# Patient Record
Sex: FEMALE | Race: WHITE | NOT HISPANIC OR LATINO | Employment: FULL TIME | URBAN - METROPOLITAN AREA
[De-identification: names, ages, dates, MRNs, and addresses within clinical notes are randomized per-mention and may not be internally consistent; named-entity substitution may affect disease eponyms.]

---

## 2016-04-04 LAB
HPV ABSTRACT: NORMAL
PAP SMEAR - HIM PATIENT REPORTED: NEGATIVE

## 2017-01-04 ENCOUNTER — ANESTHESIA EVENT (OUTPATIENT)
Dept: OBGYN | Facility: CLINIC | Age: 32
End: 2017-01-04
Payer: OTHER GOVERNMENT

## 2017-01-04 ENCOUNTER — TELEPHONE (OUTPATIENT)
Dept: NURSING | Facility: CLINIC | Age: 32
End: 2017-01-04

## 2017-01-04 ENCOUNTER — ANESTHESIA (OUTPATIENT)
Dept: OBGYN | Facility: CLINIC | Age: 32
End: 2017-01-04
Payer: OTHER GOVERNMENT

## 2017-01-04 ENCOUNTER — HOSPITAL ENCOUNTER (INPATIENT)
Facility: CLINIC | Age: 32
LOS: 2 days | Discharge: HOME | End: 2017-01-06
Attending: OBSTETRICS & GYNECOLOGY | Admitting: OBSTETRICS & GYNECOLOGY
Payer: OTHER GOVERNMENT

## 2017-01-04 LAB
ABO + RH BLD: NORMAL
ABO + RH BLD: NORMAL
SPECIMEN EXP DATE BLD: NORMAL

## 2017-01-04 PROCEDURE — 86900 BLOOD TYPING SEROLOGIC ABO: CPT | Performed by: OBSTETRICS & GYNECOLOGY

## 2017-01-04 PROCEDURE — 40000671 ZZH STATISTIC ANESTHESIA CASE

## 2017-01-04 PROCEDURE — 86780 TREPONEMA PALLIDUM: CPT | Performed by: OBSTETRICS & GYNECOLOGY

## 2017-01-04 PROCEDURE — 25800025 ZZH RX 258: Performed by: OBSTETRICS & GYNECOLOGY

## 2017-01-04 PROCEDURE — 00HU33Z INSERTION OF INFUSION DEVICE INTO SPINAL CANAL, PERCUTANEOUS APPROACH: ICD-10-PCS | Performed by: ANESTHESIOLOGY

## 2017-01-04 PROCEDURE — 12000031 ZZH R&B OB CRITICAL

## 2017-01-04 PROCEDURE — 3E0R3CZ INTRODUCTION OF REGIONAL ANESTHETIC INTO SPINAL CANAL, PERCUTANEOUS APPROACH: ICD-10-PCS | Performed by: ANESTHESIOLOGY

## 2017-01-04 PROCEDURE — 10907ZC DRAINAGE OF AMNIOTIC FLUID, THERAPEUTIC FROM PRODUCTS OF CONCEPTION, VIA NATURAL OR ARTIFICIAL OPENING: ICD-10-PCS | Performed by: OBSTETRICS & GYNECOLOGY

## 2017-01-04 PROCEDURE — 37000011 ZZH ANESTHESIA WARD SERVICE

## 2017-01-04 PROCEDURE — 86901 BLOOD TYPING SEROLOGIC RH(D): CPT | Performed by: OBSTETRICS & GYNECOLOGY

## 2017-01-04 PROCEDURE — 25000125 ZZHC RX 250: Performed by: ANESTHESIOLOGY

## 2017-01-04 PROCEDURE — 25800025 ZZH RX 258: Performed by: ANESTHESIOLOGY

## 2017-01-04 RX ORDER — METHYLERGONOVINE MALEATE 0.2 MG/ML
200 INJECTION INTRAVENOUS
Status: DISCONTINUED | OUTPATIENT
Start: 2017-01-04 | End: 2017-01-06 | Stop reason: HOSPADM

## 2017-01-04 RX ORDER — OXYTOCIN/0.9 % SODIUM CHLORIDE 30/500 ML
100-340 PLASTIC BAG, INJECTION (ML) INTRAVENOUS CONTINUOUS PRN
Status: COMPLETED | OUTPATIENT
Start: 2017-01-04 | End: 2017-01-05

## 2017-01-04 RX ORDER — NALOXONE HYDROCHLORIDE 0.4 MG/ML
.1-.4 INJECTION, SOLUTION INTRAMUSCULAR; INTRAVENOUS; SUBCUTANEOUS
Status: DISCONTINUED | OUTPATIENT
Start: 2017-01-04 | End: 2017-01-06 | Stop reason: HOSPADM

## 2017-01-04 RX ORDER — SODIUM CHLORIDE, SODIUM LACTATE, POTASSIUM CHLORIDE, CALCIUM CHLORIDE 600; 310; 30; 20 MG/100ML; MG/100ML; MG/100ML; MG/100ML
INJECTION, SOLUTION INTRAVENOUS CONTINUOUS
Status: DISCONTINUED | OUTPATIENT
Start: 2017-01-04 | End: 2017-01-06 | Stop reason: HOSPADM

## 2017-01-04 RX ORDER — ACETAMINOPHEN 325 MG/1
650 TABLET ORAL EVERY 4 HOURS PRN
Status: DISCONTINUED | OUTPATIENT
Start: 2017-01-04 | End: 2017-01-06 | Stop reason: HOSPADM

## 2017-01-04 RX ORDER — CARBOPROST TROMETHAMINE 250 UG/ML
250 INJECTION, SOLUTION INTRAMUSCULAR
Status: DISCONTINUED | OUTPATIENT
Start: 2017-01-04 | End: 2017-01-06 | Stop reason: HOSPADM

## 2017-01-04 RX ORDER — IBUPROFEN 800 MG/1
800 TABLET, FILM COATED ORAL
Status: COMPLETED | OUTPATIENT
Start: 2017-01-04 | End: 2017-01-05

## 2017-01-04 RX ORDER — ONDANSETRON 2 MG/ML
4 INJECTION INTRAMUSCULAR; INTRAVENOUS EVERY 6 HOURS PRN
Status: DISCONTINUED | OUTPATIENT
Start: 2017-01-04 | End: 2017-01-06 | Stop reason: HOSPADM

## 2017-01-04 RX ORDER — EPHEDRINE SULFATE 50 MG/ML
5 INJECTION, SOLUTION INTRAMUSCULAR; INTRAVENOUS; SUBCUTANEOUS
Status: DISCONTINUED | OUTPATIENT
Start: 2017-01-04 | End: 2017-01-06 | Stop reason: HOSPADM

## 2017-01-04 RX ORDER — OXYTOCIN 10 [USP'U]/ML
10 INJECTION, SOLUTION INTRAMUSCULAR; INTRAVENOUS
Status: DISCONTINUED | OUTPATIENT
Start: 2017-01-04 | End: 2017-01-06 | Stop reason: HOSPADM

## 2017-01-04 RX ORDER — OXYCODONE AND ACETAMINOPHEN 5; 325 MG/1; MG/1
1 TABLET ORAL
Status: DISCONTINUED | OUTPATIENT
Start: 2017-01-04 | End: 2017-01-06 | Stop reason: HOSPADM

## 2017-01-04 RX ORDER — NALBUPHINE HYDROCHLORIDE 10 MG/ML
2.5-5 INJECTION, SOLUTION INTRAMUSCULAR; INTRAVENOUS; SUBCUTANEOUS EVERY 6 HOURS PRN
Status: DISCONTINUED | OUTPATIENT
Start: 2017-01-04 | End: 2017-01-06 | Stop reason: HOSPADM

## 2017-01-04 RX ADMIN — Medication: at 23:12

## 2017-01-04 RX ADMIN — SODIUM CHLORIDE, POTASSIUM CHLORIDE, SODIUM LACTATE AND CALCIUM CHLORIDE: 600; 310; 30; 20 INJECTION, SOLUTION INTRAVENOUS at 23:12

## 2017-01-04 RX ADMIN — SODIUM CHLORIDE, POTASSIUM CHLORIDE, SODIUM LACTATE AND CALCIUM CHLORIDE 1000 ML: 600; 310; 30; 20 INJECTION, SOLUTION INTRAVENOUS at 22:11

## 2017-01-04 NOTE — IP AVS SNAPSHOT
MRN:[id number]                      After Visit Summary   1/4/2017    Frida Chaudhary    MRN: [id number]           Thank you!     Thank you for choosing [facility] for your care. Our goal is always to provide you with excellent care. Hearing back from our patients is one way we can continue to improve our services. Please take a few minutes to complete the written survey that you may receive in the mail after you visit. If you would like to speak to someone directly about your visit please contact Patient Relations at [phone]. Thank you!          Patient Information     Date Of Birth          1985        About your hospital stay     You were admitted on:  January 4, 2017 You last received care in the:  [facility]    You were discharged on:  January 6, 2017       Who to Call     For medical emergencies, please call 911.  For non-urgent questions about your medical care, please call your primary care provider or clinic, None          Attending Provider     Provider    Dia Shelley MD       Primary Care Provider    Physician No Ref-Primary       No address on file        After Care Instructions     Activity       Review discharge instructions            Diet       Resume previous diet            Discharge Instructions - Gestational diabetic patients       Gestational diabetic patients to follow up for fasting blood sugar and 2 hour 75gm glucose load at 6 weeks postpartum.            Discharge Instructions - Postpartum visit       Schedule postpartum visit with your provider and return to clinic in 6 weeks.                  Further instructions from your care team       Postpartum Vaginal Delivery Instructions    Activity       Ask family and friends for help when you need it.    Do not place anything in your vagina for 6 weeks.    You are not restricted on other activities, but take it easy for a few weeks to allow your body to recover from  delivery.  You are able to do any activities you feel up to that point.    No driving until you have stopped taking your pain medications (usually two weeks after delivery).     Call your health care provider if you have any of these symptoms:       Increased pain, swelling, redness, or fluid around your stiches from an episiotomy or perineal tear.    A fever above 100.4 F (38 C) with or without chills when placing a thermometer under your tongue.    You soak a sanitary pad with blood within 1 hour, or you see blood clots larger than a golf ball.    Bleeding that lasts more than 6 weeks.    Vaginal discharge that smells bad.    Severe pain, cramping or tenderness in your lower belly area.    A need to urinate more frequently (use the toilet more often), more urgently (use the toilet very quickly), or it burns when you urinate.    Nausea and vomiting.    Redness, swelling or pain around a vein in your leg.    Problems breastfeeding or a red or painful area on your breast.          Lactation number [phone]    Chest pain and cough or are gasping for air.    Problems coping with sadness, anxiety, or depression.  If you have any concerns about hurting yourself or the baby, call your provider immediately.     You have questions or concerns after you return home.     Keep your hands clean:  Always wash your hands before touching your perineal area and stitches.  This helps reduce your risk of infection.  If your hands aren't dirty, you may use an alcohol hand-rub to clean your hands. Keep your nails clean and short.        Pending Results     No orders found from 1/3/2017 to 1/5/2017.            Statement of Approval     Ordered          01/06/17 0848  I have reviewed and agree with all the recommendations and orders detailed in this document.   EFFECTIVE NOW     Approved and electronically signed by:  Avelina Oneill MD             Admission Information        Provider Department Dept Phone    1/4/2017 Dia LEONARD  "[facility] [phone]      Your Vitals Were     Blood Pressure Pulse Temperature    116/67 mmHg 67 98.1  F (36.7  C) (Oral)    Respirations Height Weight    16 1.727 m (5' 8\") 78.472 kg (173 lb)    BMI (Body Mass Index) Last Period       26.31 kg/m2 2016       [employer] Information     [employer] lets you send messages to your doctor, view your test results, renew your prescriptions, schedule appointments and more. To sign up, go to [email or web address] . Click on \"Log in\" on the left side of the screen, which will take you to the Welcome page. Then click on \"Sign up Now\" on the right side of the page.     You will be asked to enter the access code listed below, as well as some personal information. Please follow the directions to create your username and password.     Your access code is: [email or web address]  Expires: 2017  9:34 AM     Your access code will  in 90 days. If you need help or a new code, please call your [facility] clinic or [phone].        Care EveryWhere ID     This is your Care EveryWhere ID. This could be used by other organizations to access your [facility] medical records  [id number]           Review of your medicines      CONTINUE these medicines which have NOT CHANGED        Dose / Directions    ibuprofen 400-800 mg tablet   Commonly known as:  ADVIL,MOTRIN   Used for:  Indication for care in labor or delivery        Dose:  400-800 mg   Take 1-2 tablets (400-800 mg) by mouth every 6 hours as needed for other (cramping)   Quantity:  90 tablet   Refills:  1       prenatal multivitamin  plus iron 27-0.8 MG Tabs per tablet        Dose:  1 tablet   Take 1 tablet by mouth daily   Refills:  0         STOP taking     ZANTAC PO                    Protect others around you: Learn how to safely use, store and throw away your medicines at www.disposemymeds.org.             Medication List: This is a list of all your medications and when to take them. Check marks below " indicate your daily home schedule. Keep this list as a reference.      Medications           Morning Afternoon Evening Bedtime As Needed    ibuprofen 400-800 mg tablet   Commonly known as:  ADVIL,MOTRIN   Take 1-2 tablets (400-800 mg) by mouth every 6 hours as needed for other (cramping)                                prenatal multivitamin  plus iron 27-0.8 MG Tabs per tablet   Take 1 tablet by mouth daily

## 2017-01-04 NOTE — IP AVS SNAPSHOT
[facility]    [address]    [facility] [address]    Phone:  [phone]    Fax:  [phone]                                       After Visit Summary   1/4/2017    Frida Chaudhary    MRN: [id number]           After Visit Summary Signature Page     I have received my discharge instructions, and my questions have been answered. I have discussed any challenges I see with this plan with the nurse or doctor.    ..........................................................................................................................................  Patient/Patient Representative Signature      ..........................................................................................................................................  Patient Representative Print Name and Relationship to Patient    ..................................................               ................................................  Date                                            Time    ..........................................................................................................................................  Reviewed by Signature/Title    ...................................................              ..............................................  Date                                                            Time

## 2017-01-05 LAB — T PALLIDUM IGG+IGM SER QL: NEGATIVE

## 2017-01-05 PROCEDURE — 25000125 ZZHC RX 250: Performed by: OBSTETRICS & GYNECOLOGY

## 2017-01-05 PROCEDURE — 12000031 ZZH R&B OB CRITICAL

## 2017-01-05 PROCEDURE — 25800025 ZZH RX 258: Performed by: OBSTETRICS & GYNECOLOGY

## 2017-01-05 PROCEDURE — 72200001 ZZH LABOR CARE VAGINAL DELIVERY SINGLE

## 2017-01-05 PROCEDURE — 25000132 ZZH RX MED GY IP 250 OP 250 PS 637: Performed by: OBSTETRICS & GYNECOLOGY

## 2017-01-05 PROCEDURE — 25000125 ZZHC RX 250: Performed by: ANESTHESIOLOGY

## 2017-01-05 PROCEDURE — 0KQM0ZZ REPAIR PERINEUM MUSCLE, OPEN APPROACH: ICD-10-PCS | Performed by: OBSTETRICS & GYNECOLOGY

## 2017-01-05 RX ORDER — OXYTOCIN/0.9 % SODIUM CHLORIDE 30/500 ML
340 PLASTIC BAG, INJECTION (ML) INTRAVENOUS CONTINUOUS PRN
Status: DISCONTINUED | OUTPATIENT
Start: 2017-01-05 | End: 2017-01-06 | Stop reason: HOSPADM

## 2017-01-05 RX ORDER — NALOXONE HYDROCHLORIDE 0.4 MG/ML
.1-.4 INJECTION, SOLUTION INTRAMUSCULAR; INTRAVENOUS; SUBCUTANEOUS
Status: DISCONTINUED | OUTPATIENT
Start: 2017-01-05 | End: 2017-01-06 | Stop reason: HOSPADM

## 2017-01-05 RX ORDER — OXYTOCIN/0.9 % SODIUM CHLORIDE 30/500 ML
100 PLASTIC BAG, INJECTION (ML) INTRAVENOUS CONTINUOUS
Status: DISCONTINUED | OUTPATIENT
Start: 2017-01-05 | End: 2017-01-06 | Stop reason: HOSPADM

## 2017-01-05 RX ORDER — AMOXICILLIN 250 MG
1-2 CAPSULE ORAL 2 TIMES DAILY
Status: DISCONTINUED | OUTPATIENT
Start: 2017-01-05 | End: 2017-01-06 | Stop reason: HOSPADM

## 2017-01-05 RX ORDER — ACETAMINOPHEN 325 MG/1
650 TABLET ORAL EVERY 4 HOURS PRN
Status: DISCONTINUED | OUTPATIENT
Start: 2017-01-05 | End: 2017-01-06 | Stop reason: HOSPADM

## 2017-01-05 RX ORDER — OXYTOCIN/0.9 % SODIUM CHLORIDE 30/500 ML
1-24 PLASTIC BAG, INJECTION (ML) INTRAVENOUS CONTINUOUS
Status: DISCONTINUED | OUTPATIENT
Start: 2017-01-05 | End: 2017-01-06 | Stop reason: HOSPADM

## 2017-01-05 RX ORDER — BISACODYL 10 MG
10 SUPPOSITORY, RECTAL RECTAL DAILY PRN
Status: DISCONTINUED | OUTPATIENT
Start: 2017-01-07 | End: 2017-01-06 | Stop reason: HOSPADM

## 2017-01-05 RX ORDER — LANOLIN 100 %
OINTMENT (GRAM) TOPICAL
Status: DISCONTINUED | OUTPATIENT
Start: 2017-01-05 | End: 2017-01-06 | Stop reason: HOSPADM

## 2017-01-05 RX ORDER — OXYTOCIN 10 [USP'U]/ML
10 INJECTION, SOLUTION INTRAMUSCULAR; INTRAVENOUS
Status: DISCONTINUED | OUTPATIENT
Start: 2017-01-05 | End: 2017-01-06 | Stop reason: HOSPADM

## 2017-01-05 RX ORDER — MISOPROSTOL 200 UG/1
400 TABLET ORAL
Status: DISCONTINUED | OUTPATIENT
Start: 2017-01-05 | End: 2017-01-06 | Stop reason: HOSPADM

## 2017-01-05 RX ORDER — IBUPROFEN 400 MG/1
400-800 TABLET, FILM COATED ORAL EVERY 6 HOURS PRN
Status: DISCONTINUED | OUTPATIENT
Start: 2017-01-05 | End: 2017-01-06 | Stop reason: HOSPADM

## 2017-01-05 RX ORDER — HYDROCORTISONE 2.5 %
CREAM (GRAM) TOPICAL 3 TIMES DAILY PRN
Status: DISCONTINUED | OUTPATIENT
Start: 2017-01-05 | End: 2017-01-06 | Stop reason: HOSPADM

## 2017-01-05 RX ORDER — OXYCODONE HYDROCHLORIDE 5 MG/1
5-10 TABLET ORAL
Status: DISCONTINUED | OUTPATIENT
Start: 2017-01-05 | End: 2017-01-06 | Stop reason: HOSPADM

## 2017-01-05 RX ADMIN — EPHEDRINE SULFATE 5 MG: 50 INJECTION INTRAMUSCULAR; INTRAVENOUS; SUBCUTANEOUS at 00:47

## 2017-01-05 RX ADMIN — IBUPROFEN 800 MG: 800 TABLET, FILM COATED ORAL at 13:15

## 2017-01-05 RX ADMIN — SODIUM CHLORIDE, POTASSIUM CHLORIDE, SODIUM LACTATE AND CALCIUM CHLORIDE: 600; 310; 30; 20 INJECTION, SOLUTION INTRAVENOUS at 04:40

## 2017-01-05 RX ADMIN — IBUPROFEN 800 MG: 400 TABLET ORAL at 20:06

## 2017-01-05 RX ADMIN — SENNOSIDES AND DOCUSATE SODIUM 1 TABLET: 8.6; 5 TABLET ORAL at 20:06

## 2017-01-05 RX ADMIN — OXYTOCIN-SODIUM CHLORIDE 0.9% IV SOLN 30 UNIT/500ML 2 MILLI-UNITS/MIN: 30-0.9/5 SOLUTION at 05:38

## 2017-01-05 RX ADMIN — OXYTOCIN-SODIUM CHLORIDE 0.9% IV SOLN 30 UNIT/500ML 340 ML/HR: 30-0.9/5 SOLUTION at 11:07

## 2017-01-05 NOTE — PROVIDER NOTIFICATION
01/04/17 2308   Provider Notification   Provider Name/Title Dr Desai   Method of Notification At Bedside   Request Evaluate in Person   Notification Reason Pain   MDA at bedside to place epidural

## 2017-01-05 NOTE — PROVIDER NOTIFICATION
01/05/17 0518   Provider Notification   Provider Name/Title Dr Benitez   Method of Notification Electronic Page   Request Evaluate - Remote   Notification Reason Membrane Status;Uterine Activity;Pain;Status Update;SVE   SVE 5.5/80/-3 BBOW, has epidural. Ctx irregular 2-8 min. Resting comfortably. Category 1 tracing. Would you like me to start Pitocin?

## 2017-01-05 NOTE — PLAN OF CARE
Bedside report received from Frida. Plan of care and pain goals discussed. LR bolus infusing for planned epidural.

## 2017-01-05 NOTE — PROVIDER NOTIFICATION
01/05/17 0523   Provider Notification   Provider Name/Title Dr Benitez   Method of Notification Phone   Request Evaluate - Remote   Notification Reason Status Update;Other (Comment)   Physician received text page, orders received to augment labor with pitocin and continue to monitor.

## 2017-01-05 NOTE — PLAN OF CARE
2115 Dr Benitez notified of pt arrival, sve, fhts mod variab w/accels, gbs negative. Pt would like epidural when ready. Orders received for intrapartum labor.  Dr Benitez is 30 min away.    2235 bedside report given to Kerri LEONARD RN, cares handed over.

## 2017-01-05 NOTE — PROVIDER NOTIFICATION
01/04/17 2115   Provider Notification   Provider Name/Title Dr Benitez   Method of Notification Electronic Page;Phone   Notification Reason Patient Arrived;Labor Status;SVE   see note

## 2017-01-05 NOTE — TELEPHONE ENCOUNTER
"Call Type: Triage Call    Presenting Problem: \"I am 38 weeks pregnant and my contractions are  4-5 minutes apart.\" Pt. says that she sees a DrJeanine at the [facility]. Pt. says that she does not know the phone #, to that  clinic. RN then gave pt. the phone # to the [facility],  and told pt. that she can ask the  for the phone #, to that  clinic. RN told pt. that she needs to speak to the OB Dr., who is  on-call now. Pt. voiced understanding and says that she will do  this.  Triage Note:  Guideline Title: Information Only Call; No Symptom Triage (Adult) ;  Information Only Call; No Symptom Triage (Adult)  Recommended Disposition: Call Provider When Office is Open  Original Inclination: Wanted to speak with a nurse  Override Disposition: Call Provider Immediately  Intended Action: Follow advice given  Physician Contacted: No  Requesting information and provider is best resource; no triage required. ?  YES  Requesting regular office appointment ? NO  Sign(s) or symptom(s) associated with a diagnosed condition or with a new illness  ? NO  Requesting information about provider, services or community resources ? NO  Call back to complete assessment/clarification of information from prior caller to  complete triage ? NO  Physician Instructions:  Care Advice:  "

## 2017-01-05 NOTE — L&D DELIVERY NOTE
PRENATAL COURSE:  Frida Chaudhary is a 31-year-old  3, para 1-0-1-1 with a last menstrual period of 2016 and an EDC of 2017.  She was admitted to the hospital at 38 and 1/7 weeks gestation in active labor.  The patient's prenatal course was basically uncomplicated.  She had a history of herpes and started Valtrex prophylaxis at 36 weeks gestation.  She had a normal level 2 ultrasound due to a family history of spina bifida.      HOSPITAL COURSE:  At the time of admission, the patient was dl regularly.  Her cervix was 5 cm dilated.  External fetal heart rate monitoring showed a category 1 tracing.  She received an epidural for anesthesia and then had a secondary arrest of labor at 5-6 cm dilated.  Pitocin augmentation was begun.  Artificial rupture was performed when she was 8 cm dilated.  The fluid was clear.  Continued fetal heart rate monitoring showed occasional early decelerations and variable decelerations.  The patient became complete and then labored down for a little over an hour.  She had good expulsive efforts and delivered with 3 pushes.      On 2017 at 11:03 a.m., the patient delivered a healthy male with Apgars of 9 at 1 minute and 9 at 5 minutes.  No weight is available at the time of this dictation.  The baby delivered from the vertex position.  No episiotomy was performed.  There was a vaginal laceration which occurred with the first push and then at the time of delivery a second-degree perineal laceration.  These were repaired in the usual manner with a running 3-0 Vicryl.  The placenta followed spontaneously, was intact and had a 3-vessel cord.  Pitocin was running IV following delivery of the placenta.  Mother and infant were both stable following delivery.      First stage of labor was 15 hours 20 minutes, second stage 1 hour 43 minutes, third stage 3 minutes.  Estimated blood loss was 450 cc.  Mother and infant were stable following delivery.         HARDEEP  PRICILA SINGLETON MD             D: 2017 11:30   T: 2017 13:17   MT: [id number]      Name:     CHET WHITE   MRN:      [id number]        Account:        [id number]   :      1985           Delivery Date:  2017      Document: [id number]       cc: [facility]

## 2017-01-05 NOTE — ANESTHESIA PROCEDURE NOTES
Peripheral nerve/Neuraxial procedure note : epidural catheter  Pre-Procedure  Performed by JORGE LUIS DOMINGUEZ  Referred by LEONELA  Location:   Procedure Times:1/4/2017 11:00 PM and 1/4/2017 11:12 PM  Pre-Anesthestic Checklist: patient identified, IV checked, site marked, risks and benefits discussed, informed consent, monitors and equipment checked, pre-op evaluation and at physician/surgeon's request    Timeout  Correct Patient: Yes   Correct Procedure: Yes   Correct Site: Yes   Correct Laterality: Yes and N/A   Correct Position: Yes   Site Marked: Yes, N/A   .   Procedure Documentation  ASA 2  .    Procedure:    Epidural catheter.     .  .       Assessment/Narrative  .  .  . Comments:  Pre-Procedure  Performed by Jorge Luis Dominguez MD  Location: OB.      PreAnesthestic Checklist: patient identified, IV checked, risks and benefits discussed, informed consent obtained, monitors and equipment checked, pre-op evaluation and at physician/surgeon's request.    Timeout   Correct Patient: Yes  Correct Procedure: Epidural catheter placement  Correct Site: Yes   Correct Position: Yes    Procedure Documentation  Procedure:   Epidural catheter block for Labor    Patient currently in labor and she and OBMD request a labor epidural to control her labor pains. Patient was interviewed and examined. Procedure and risks including but not limited to bleeding, infection, nerve injury, paralysis, PDPH, and inadequate block requiring intervention discussed with patient. Questions answered. This epidural is to be placed in anticipation of vaginal delivery.  She consents to the epidural procedure.  Time-out was performed.  I or my partners remain immediately available for management of any issues or complications and will monitor at appropriate intervals.  Procedure: Patient sitting. Betadine prep x 3. Sterile drape applied.  Mask and gloves used.  Lidocaine 1%  local infiltration at L 3-4.  17 G. Tuohy needle at L3-4 by loss of  resistance into epidural space.  No CSF, paresthesia or blood. 1.5 % Lidocaine with 1:200,000 Epinephrine 5cc test dose. Epidural catheter inserted w/o resistance to 5 cm in epidural space. Then 0.25% bupivicaine 10 cc with NS 5 cc.  Aspiration negative for blood and CSF.   Negative for neuro change, paresthesia or symptoms of intravascular injection or intrathecal injection.  Infusion orders written and infusion of 0.125% bupivicaine 15cc per hour started.    Jorge Luis Desai MD

## 2017-01-05 NOTE — PLAN OF CARE
Data: Frida Chaudhary transferred to 443 via wheelchair at 1430 Baby transferred via mother's arms, prior to transfer patient unable to lift buttocks off bed or urge to void, uterus misplaced, straight cathed for 1000cc tolerated well, ice reapplied to william area, patient with assist of 2 and use of Jannet Steady into wheelchair,  Action: Receiving unit notified of transfer: Yes. Patient and family notified of room change. Patient assisted into bed with the assist of 2, does not have much weight bearing in either leg, Belongings sent to receiving unit. Accompanied by Registered Nurse. Oriented patient to surroundings. Call light within reach. ID bands double-checked  Report given to Payton FUENTES  Response: Patient tolerated transfer and is stable. Understands not to get out of bed without assistance.

## 2017-01-05 NOTE — ANESTHESIA PREPROCEDURE EVALUATION
PAC NOTE:       ANESTHESIA PRE EVALUATION:  Anesthesia Evaluation       history and physical reviewed .      No history of anesthetic complications     ROS/MED HX    ENT/Pulmonary:  - neg pulmonary ROS     Neurologic:  - neg neurologic ROS     Cardiovascular:  - neg cardiovascular ROS       METS/Exercise Tolerance:     Hematologic:         Musculoskeletal:         GI/Hepatic:     (+) GERD      (-) hepatitis   Renal/Genitourinary:         Endo:         Psychiatric:         Infectious Disease:         Malignancy:         Other:               Physical Exam  Normal systems: cardiovascular and pulmonary    Airway   Mallampati: II  TM distance: > 3 FB  Neck ROM: full  Mouth opening: > 3 cm    Dental     Cardiovascular       Pulmonary               scoliosis      Anesthesia Plan      History & Physical Review      ASA Status:  .  OB Epidural Asa: 2            Postoperative Care      Consents  Anesthetic plan, risks, benefits and alternatives discussed with:  Patient..                            .

## 2017-01-05 NOTE — H&P
"OB Brief Admit H&P    No significant change in general health status based on examination of the patient, review of Nursing Admission Database and prenatal record.    Pt is a 31 year old  @ 38w1d who presented to L&D with labor. Comfortable with epidural. On pitocin    Patient's prenatal course has been uncomplicated   HX HSV- on valtrex since 36 weeks    Prenatal Labs:    Blood type A pos  Rubella immune  [id number]  GBS neg    EFW: 7lbs    /62 mmHg  Temp(Src) 98.5  F (36.9  C) (Oral)  Resp 16  Ht 1.727 m (5' 8\")  Wt 78.472 kg (173 lb)  BMI 26.31 kg/m2  LMP 2016  EFM:  Early deccelerations, reactive with exam  [place]: q3minf  SVE: 8/100%/0  Membranes:    AROM clear    Assessment:  31 year old  @ 38w1d admitted for labor    Plan:  1. Admit to labor and delivery   2. Anticipate     Kita Connell  2017  8:22 AM      "

## 2017-01-06 ENCOUNTER — DOCUMENTATION ONLY (OUTPATIENT)
Dept: CARE COORDINATION | Facility: CLINIC | Age: 32
End: 2017-01-06

## 2017-01-06 VITALS
WEIGHT: 173 LBS | TEMPERATURE: 98.1 F | HEART RATE: 67 BPM | RESPIRATION RATE: 16 BRPM | HEIGHT: 68 IN | SYSTOLIC BLOOD PRESSURE: 116 MMHG | DIASTOLIC BLOOD PRESSURE: 67 MMHG | BODY MASS INDEX: 26.22 KG/M2

## 2017-01-06 LAB — HGB BLD-MCNC: 10.9 G/DL (ref 11.7–15.7)

## 2017-01-06 PROCEDURE — 36415 COLL VENOUS BLD VENIPUNCTURE: CPT | Performed by: OBSTETRICS & GYNECOLOGY

## 2017-01-06 PROCEDURE — 85018 HEMOGLOBIN: CPT | Performed by: OBSTETRICS & GYNECOLOGY

## 2017-01-06 PROCEDURE — 25000132 ZZH RX MED GY IP 250 OP 250 PS 637: Performed by: OBSTETRICS & GYNECOLOGY

## 2017-01-06 PROCEDURE — 40000083 ZZH STATISTIC IP LACTATION SERVICES 1-15 MIN

## 2017-01-06 RX ADMIN — IBUPROFEN 800 MG: 400 TABLET ORAL at 08:04

## 2017-01-06 RX ADMIN — IBUPROFEN 800 MG: 400 TABLET ORAL at 02:11

## 2017-01-06 RX ADMIN — IBUPROFEN 800 MG: 400 TABLET ORAL at 14:06

## 2017-01-06 RX ADMIN — SENNOSIDES AND DOCUSATE SODIUM 1 TABLET: 8.6; 5 TABLET ORAL at 08:04

## 2017-01-06 NOTE — PROGRESS NOTES
[facility] now requests orders and shares plan of care/discharge summaries for some patients through [facility]. Thank you for your assistance in improving collaboration for our patients.    [facility] is unable to see patient for postpartum/ assessment and education due to patient insurance not contracted with [facility] for this service.  Patient advised to contact their insurance provider to determine if service is covered through another homecare agency. Offered option of private pay nurse assessment and education for mom and baby at service rate of 180.00 per couplet.  Provided call back information if private pay visit is requested on patients voicemail.     Thank you for the referral.  Sincerely, [facility] [phone]

## 2017-01-06 NOTE — ADDENDUM NOTE
Addendum  created 01/06/17 0902 by Jorge Luis Desai MD    Modules edited: Clinical Notes    Clinical Notes:  File: [id number]

## 2017-01-06 NOTE — PROGRESS NOTES
January 6, 2017   Postpartum Progress Note:       DAILY NOTE - POSTPARTUM DAY 1    SUBJECTIVE: Feeling good, nursing going well. She would like d/c later today if ok with peds. Pain is controlled with ibuprofen.    Pain controlled? Yes  Tolerating a regular diet? YES  Ambulating? YES  Voiding without difficulty? Yes  Lochia? minimal  Breastfeeding:  yes    OBJECTIVE:  Filed Vitals:    01/05/17 1252 01/05/17 1350 01/05/17 1813 01/06/17 0211   BP: 103/63 104/60 109/80 106/72   Temp:  98  F (36.7  C) 98.7  F (37.1  C)    TempSrc:   Oral    Resp:   16 16   Height:       Weight:           Constitutional: healthy, alert and no distress    Abdomen:  Uterine fundus is firm, non-tender and at the level of the umbilicus  -2    Extremeties:  no edema, non-tender bilaterally    LABS:  HEMOGLOBIN   Date Value Ref Range Status   01/06/2017 10.9* 11.7 - 15.7 g/dL Final   01/01/2015 9.4* 11.7 - 15.7 g/dL Final     No results found for this basename: RUBELLAABIGG   ABO        A   1/4/2017      RH      Pos   1/4/2017     ASSESSMENT:  Post-partum day #1  Normal spontaneous vaginal delivery  Doing well.     PLAN:   D/C home later today per pt request if ok with peds  RTC in 6wks and activity restrictions discussed  No scripts needed    Avelina Oneill MD

## 2017-01-06 NOTE — LACTATION NOTE
LC to see patient.  She reports that nursing has been going well.  She has no questions or concerns.  Her nipples are reddened but intact.  She was encouraged to assess her nipples post feeds to make sure they remain rounded to avoid any nipple damage.  A laid back position was also suggested.

## 2017-01-06 NOTE — PLAN OF CARE
Problem: Goal Outcome Summary  Goal: Goal Outcome Summary  Outcome: Improving  Pt stable, vitals WNL. Breastfeeding well, independently. Pt up and ambulating in room/hallway. Void without difficulty. Taking ibuprofen for discomfort and using warm packs for cramping. Pt requesting early discharge today. Discharge instructions reviewed with pt, all questions answered. [facility] referral made due to early d/c

## 2017-01-06 NOTE — PLAN OF CARE
Problem: Goal Outcome Summary  Goal: Goal Outcome Summary  Outcome: No Change  Attempting to sleep between feedings. Motrin for discomfort with reported decrease. Monitor.

## 2017-01-06 NOTE — DISCHARGE INSTRUCTIONS

## 2018-02-09 ENCOUNTER — APPOINTMENT (OUTPATIENT)
Dept: GENERAL RADIOLOGY | Facility: CLINIC | Age: 33
End: 2018-02-09
Attending: PHYSICIAN ASSISTANT
Payer: OTHER GOVERNMENT

## 2018-02-09 ENCOUNTER — HOSPITAL ENCOUNTER (EMERGENCY)
Facility: CLINIC | Age: 33
Discharge: HOME | End: 2018-02-09
Attending: PHYSICIAN ASSISTANT | Admitting: PHYSICIAN ASSISTANT
Payer: OTHER GOVERNMENT

## 2018-02-09 VITALS
DIASTOLIC BLOOD PRESSURE: 74 MMHG | HEART RATE: 75 BPM | OXYGEN SATURATION: 99 % | RESPIRATION RATE: 18 BRPM | TEMPERATURE: 98.1 F | SYSTOLIC BLOOD PRESSURE: 119 MMHG

## 2018-02-09 DIAGNOSIS — S60.221A CONTUSION OF RIGHT HAND, INITIAL ENCOUNTER: ICD-10-CM

## 2018-02-09 DIAGNOSIS — S69.91XA INJURY OF RIGHT HAND, INITIAL ENCOUNTER: ICD-10-CM

## 2018-02-09 PROCEDURE — G0463 HOSPITAL OUTPT CLINIC VISIT: HCPCS

## 2018-02-09 PROCEDURE — 99213 OFFICE O/P EST LOW 20 MIN: CPT | Performed by: PHYSICIAN ASSISTANT

## 2018-02-09 PROCEDURE — 73130 X-RAY EXAM OF HAND: CPT | Mod: RT

## 2018-02-09 NOTE — ED AVS SNAPSHOT
[facility]    [address] [address]    Phone:  [phone]    Fax:  [phone]                                       Frida Chaudhary   MRN: [id number]    Department:  [facility]   Date of Visit:  2/9/2018           After Visit Summary Signature Page     I have received my discharge instructions, and my questions have been answered. I have discussed any challenges I see with this plan with the nurse or doctor.    ..........................................................................................................................................  Patient/Patient Representative Signature      ..........................................................................................................................................  Patient Representative Print Name and Relationship to Patient    ..................................................               ................................................  Date                                            Time    ..........................................................................................................................................  Reviewed by Signature/Title    ...................................................              ..............................................  Date                                                            Time

## 2018-02-09 NOTE — ED AVS SNAPSHOT
[facility]    [address] [address]    Phone:  [phone]    Fax:  [phone]                                       Frida Chaudhary   MRN: [id number]    Department:  [facility]   Date of Visit:  2/9/2018           Patient Information     Date Of Birth          1985        Your diagnoses for this visit were:     Injury of right hand, initial encounter     Contusion of right hand, initial encounter        You were seen by Papito Bloom PA-C.        Discharge Instructions         Hand Contusion  You have a contusion. This is also called a bruise. There is swelling and some bleeding under the skin, but no broken bones. This injury generally takes a few days to a few weeks to heal.  During that time, the bruise will typically change in color from reddish, to purple-blue, to greenish-yellow, then to yellow-brown.  Home care    Elevate the hand to reduce pain and swelling. As much as possible, sit or lie down with the hand raised about the level of your heart. This is especially important during the first 48 hours.    Ice the hand to help reduce pain and swelling. Wrap a cold source (ice pack or ice cubes in a plastic bag) in a thin towel. Apply to the bruised area for 20 minutes every 1 to 2 hours the first day. Continue this 3 to 4 times a day until the pain and swelling goes away.    Unless another medicine was prescribed, you can take acetaminophen, ibuprofen, or naproxen to control pain. (If you have chronic liver or kidney disease or ever had a stomach ulcer or gastrointestinal bleeding, talk with your doctor before using these medicines.)  Follow up  Follow up with your healthcare provider or our staff as advised. Call if you are not improving within 1 to 2 weeks.  When to seek medical advice   Call your healthcare provider right away if you have any of the following:    Increased pain or swelling    Arm becomes cold, blue,  numb or tingly    Signs of infection: Warmth, drainage, or increased redness or pain around the bruise    Inability to move the injured hand     Frequent bruising for unknown reasons  Date Last Reviewed: 2/1/2017 2000-2017 [employer]. [address] [place] [address]. All rights reserved. This information is not intended as a substitute for professional medical care. Always follow your healthcare professional's instructions.          24 Hour Appointment Hotline       To make an appointment at any [facility], call [phone] ([phone]). If you don't have a family doctor or clinic, we will help you find one. [facility] clinics are conveniently located to serve the needs of you and your family.             Review of your medicines      Our records show that you are taking the medicines listed below. If these are incorrect, please call your family doctor or clinic.        Dose / Directions Last dose taken    ibuprofen 400-800 mg tablet   Commonly known as:  ADVIL,MOTRIN   Dose:  400-800 mg   Quantity:  90 tablet        Take 1-2 tablets (400-800 mg) by mouth every 6 hours as needed for other (cramping)   Refills:  1        prenatal multivitamin plus iron 27-0.8 MG Tabs per tablet   Dose:  1 tablet        Take 1 tablet by mouth daily   Refills:  0                Procedures and tests performed during your visit     Hand XR, G/E 3 views, right      Orders Needing Specimen Collection     None      Pending Results     No orders found from 2/7/2018 to 2/10/2018.            Pending Culture Results     No orders found from 2/7/2018 to 2/10/2018.            Pending Results Instructions     If you had any lab results that were not finalized at the time of your Discharge, you can call the ED Lab Result RN at [phone]. You will be contacted by this team for any positive Lab results or changes in treatment. The nurses are available 7 days a week from 10A to 6:30P.  You can leave a message  "24 hours per day and they will return your call.        Test Results From Your Hospital Stay        2018  6:30 PM      Narrative     RIGHT HAND THREE OR MORE VIEWS  2018  6:23 PM     HISTORY: MCP joint pain of pointer ad middle finger after fell on ice  last night.    COMPARISON: None.        Impression     IMPRESSION: No bony or soft tissue abnormality.    ZORAIDA GARCIA MD                Thank you for choosing [facility]       Thank you for choosing [facility] for your care. Our goal is always to provide you with excellent care. Hearing back from our patients is one way we can continue to improve our services. Please take a few minutes to complete the written survey that you may receive in the mail after you visit with us. Thank you!        [employer] Information     [facility] lets you send messages to your doctor, view your test results, renew your prescriptions, schedule appointments and more. To sign up, go to [email or web address] . Click on \"Log in\" on the left side of the screen, which will take you to the Welcome page. Then click on \"Sign up Now\" on the right side of the page.     You will be asked to enter the access code listed below, as well as some personal information. Please follow the directions to create your username and password.     Your access code is: [email or web address]  Expires: 5/10/2018  6:38 PM     Your access code will  in 90 days. If you need help or a new code, please call your [facility] clinic or [phone].        Care EveryWhere ID     This is your Care EveryWhere ID. This could be used by other organizations to access your [facility] medical records  [id number]        Equal Access to Services     [facility] Hospital[facility]UCINDA : Hadii horace bell Sogunnar, waaxda luqadaha, qaybta kaalmaleanne sims . So [facility] [phone].    ATENCIÓN: Si habla español, tiene a cummings disposición servicios gratuitos de asistencia lingüística. Llame al [phone].    We " comply with applicable federal civil rights laws and [place] laws. We do not discriminate on the basis of race, color, national origin, age, disability, sex, sexual orientation, or gender identity.            After Visit Summary       This is your record. Keep this with you and show to your community pharmacist(s) and doctor(s) at your next visit.

## 2018-02-10 NOTE — ED NOTES
Patient here for pain in the R hand - fell playing [employer] last evening.  Patient presents ambulatory to the urgent care.

## 2018-02-10 NOTE — ED PROVIDER NOTES
Chief Complaint:    No chief complaint on file.      HPI:    Frida Chaudhary is a 32 year old female who sustained a right hand injury 1 days ago. Mechanism of injury: Patient fell playing broomball landing on the R MCP joints while holding her broom. Immediate symptoms: immediate pain, immediate swelling. Symptoms have been unchanged since that time. Prior history of related problems: no prior problems with this area in the past.  She did not hit her head at all.  No other complaints.    ROS: Further problem focused system review was otherwise negative.     Physical Exam:     Vital signs were reviewed and noted by me.  /74  Pulse 75  Temp 98.1  F (36.7  C) (Oral)  Resp 18  SpO2 99%    General appearance: healthy, alert and no distress  Ears: R TM - normal: no effusions, no erythema, and normal landmarks, L TM - normal: no effusions, no erythema, and normal landmarks  Eyes: R normal, L normal  Nose: normal  Oropharynx: normal  Neck: supple and no adenopathy  Lungs: normal and clear to auscultation  Heart: S1, S2 normal, no murmur, click, rub or gallop, regular rate and rhythm  Hand and wrist exam: R hand - Swelling and ecchymosis of the pointer and middle finger MCP joints.  No deformity.  Full range of motion of all digits.  Digits neurologically intact.      X-ray per radiology read:    Results for orders placed or performed during the hospital encounter of 02/09/18   Hand XR, G/E 3 views, right    Narrative    RIGHT HAND THREE OR MORE VIEWS  2/9/2018  6:23 PM     HISTORY: MCP joint pain of pointer ad middle finger after fell on ice  last night.    COMPARISON: None.      Impression    IMPRESSION: No bony or soft tissue abnormality.    ZORAIDA GARCIA MD       ASSESSMENT:     (S69.91XA) Injury of right hand, initial encounter    (S60.221A) Contusion of right hand, initial encounter       PLAN:     Discussed imaging with patient.  This was negative for any acute fracture.  Patient offered splint ad  declined.  She was instructed to ice the area.  Ibuprofen for comfort.  She will follow up with her PCP in 1-2 weeks if symptoms are not improving.   Patient verbalized understanding ad agreed with this plan.     Papito Bloom  2/9/2018, 6:01 PM       Papito Bloom PA-C  02/09/18 1847

## 2018-02-10 NOTE — DISCHARGE INSTRUCTIONS
Hand Contusion  You have a contusion. This is also called a bruise. There is swelling and some bleeding under the skin, but no broken bones. This injury generally takes a few days to a few weeks to heal.  During that time, the bruise will typically change in color from reddish, to purple-blue, to greenish-yellow, then to yellow-brown.  Home care    Elevate the hand to reduce pain and swelling. As much as possible, sit or lie down with the hand raised about the level of your heart. This is especially important during the first 48 hours.    Ice the hand to help reduce pain and swelling. Wrap a cold source (ice pack or ice cubes in a plastic bag) in a thin towel. Apply to the bruised area for 20 minutes every 1 to 2 hours the first day. Continue this 3 to 4 times a day until the pain and swelling goes away.    Unless another medicine was prescribed, you can take acetaminophen, ibuprofen, or naproxen to control pain. (If you have chronic liver or kidney disease or ever had a stomach ulcer or gastrointestinal bleeding, talk with your doctor before using these medicines.)  Follow up  Follow up with your healthcare provider or our staff as advised. Call if you are not improving within 1 to 2 weeks.  When to seek medical advice   Call your healthcare provider right away if you have any of the following:    Increased pain or swelling    Arm becomes cold, blue, numb or tingly    Signs of infection: Warmth, drainage, or increased redness or pain around the bruise    Inability to move the injured hand     Frequent bruising for unknown reasons  Date Last Reviewed: 2/1/2017 2000-2017 [employer]. [address] [place] [address], [place] [address]. All rights reserved. This information is not intended as a substitute for professional medical care. Always follow your healthcare professional's instructions.

## 2018-09-12 LAB
HBV SURFACE AG SERPL QL IA: NORMAL
HIV 1+2 AB+HIV1 P24 AG SERPL QL IA: NORMAL
RUBELLA ANTIBODY IGG QUANTITATIVE: NORMAL IU/ML

## 2018-10-10 ENCOUNTER — TRANSFERRED RECORDS (OUTPATIENT)
Dept: HEALTH INFORMATION MANAGEMENT | Facility: CLINIC | Age: 33
End: 2018-10-10

## 2018-11-09 ENCOUNTER — PRE VISIT (OUTPATIENT)
Dept: MATERNAL FETAL MEDICINE | Facility: CLINIC | Age: 33
End: 2018-11-09

## 2018-11-16 ENCOUNTER — OFFICE VISIT (OUTPATIENT)
Dept: MATERNAL FETAL MEDICINE | Facility: CLINIC | Age: 33
End: 2018-11-16
Attending: OBSTETRICS & GYNECOLOGY
Payer: OTHER GOVERNMENT

## 2018-11-16 ENCOUNTER — HOSPITAL ENCOUNTER (OUTPATIENT)
Dept: ULTRASOUND IMAGING | Facility: CLINIC | Age: 33
Discharge: HOME | End: 2018-11-16
Attending: OBSTETRICS & GYNECOLOGY | Admitting: OBSTETRICS & GYNECOLOGY
Payer: OTHER GOVERNMENT

## 2018-11-16 DIAGNOSIS — O26.90 PREGNANCY RELATED CONDITION, ANTEPARTUM: ICD-10-CM

## 2018-11-16 DIAGNOSIS — Z82.79 FAMILY HISTORY OF NEURAL TUBE DEFECT: Primary | ICD-10-CM

## 2018-11-16 PROCEDURE — 76811 OB US DETAILED SNGL FETUS: CPT

## 2018-11-16 PROCEDURE — 96040 ZZH GENETIC COUNSELING, EACH 30 MINUTES: CPT | Mod: ZF | Performed by: GENETIC COUNSELOR, MS

## 2018-11-16 NOTE — PROGRESS NOTES
[facility]  Genetic Counseling Consult    Patient:  Frida Chaudhary YOB: 1985   Date of Service:  18      Frida Chaudhary was seen at the [facility] for genetic consultation as part of her appointment for comprehensive ultrasound.  The indication for genetic counseling is family history of neural tube defects.       Impression/Plan:   1. Frida had a quad screen earlier in pregnancy, which was screen negative.    2. Frida had a comprehensive (level II) ultrasound today, which was normal.  Please see the ultrasound report for further details.    Pregnancy History:   /Parity:    Age at Delivery: 33 year old  MILLY: 2019, by Ultrasound  Gestational Age: 18w6d    No significant complications or exposures were reported in the current pregnancy.    Frida conti pregnancy history is significant for 2 prior full term pregnancies with no reported obstetric complications.    Medical History:   Frida conti reported medical history is not expected to impact pregnancy management or risks to fetal development.       Family History:   A three-generation pedigree was obtained at a previous genetic counseling encounter, and is scanned under the  Media  tab.  This pedigree was reviewed today and Frida reports the following significant updates.  The following significant findings were reported by Frida:    Frida's son was born with an extra thumb on one hand that was surgically removed.  Frida reports that her son is healthy and developmentally on track.  We discussed that extra fingers or toes can be seen to run in families at times, and that her current pregnancy may have a slightly increased likelihood for this finding based on the family history.  Today's ultrasound noted no evidence of extra digits, but this cannot be ruled out by ultrasound.      Frida's  has a maternal half brother with  spina bifida.  We discussed that this history may slightly increase risks for her pregnancy to be affected with a neural tube defect, but that Frida's quad screen includes screening for neural tube defects, and was negative, or low risk, for these concerns.  Today's ultrasound was also normal for spinal anatomy.    Otherwise, the reported family history is negative for multiple miscarriages, stillbirths, birth defects, cognitive impairment, known genetic conditions, and consanguinity.       Carrier Screening:       Carrier screening was not discussed today but has been discussed and declined in previous GC appointments.       Risk Assessment for Chromosome Conditions:   We explained that the risk for fetal chromosome abnormalities increases with maternal age. We discussed specific features of common chromosome abnormalities, including Down syndrome, trisomy 13, trisomy 18, and sex chromosome trisomies.      - At age 33 at midtrimester, the risk to have a baby with Down syndrome is 1 in 421.     - At age 33 at midtrimester, the risk to have a baby with any chromosome abnormality is 1 in 217.      Frida had maternal serum screening earlier in pregnancy.      Quad screen    Maternal plasma AFP, hCG, estriol, and inhibin measurement between 15-24 weeks gestation    Provides risk assessment for fetal Down syndrome, trisomy 18, and neural tube defects    Frida had a quad screen earlier in pregnancy; we reviewed the results today, which were Screen Negative    This screen gave the following risk assessments:    Down syndrome risk= 1:3,500    Trisomy 18 risk= 1:50,000    Open neural tube defects risk= 1:1,200         Testing Options:   We discussed the following options:   Non-invasive Prenatal Testing (NIPT)    Maternal plasma cell-free DNA testing; first trimester ultrasound with nuchal translucency and nasal bone assessment is recommended, when appropriate    Screens for fetal trisomy 21, trisomy 13, trisomy 18,  and sex chromosome aneuploidy    Cannot screen for open neural tube defects; maternal serum AFP after 15 weeks is recommended       Genetic Amniocentesis    Invasive procedure typically performed in the second trimester by which amniotic fluid is obtained for the purpose of chromosome analysis and/or other prenatal genetic analysis    Diagnostic results; >99% sensitivity for fetal chromosome abnormalities    AFAFP measurement tests for open neural tube defects       Comprehensive (Level II) ultrasound: Detailed ultrasound performed between 18-22 weeks gestation to screen for major birth defects and markers for aneuploidy.        We reviewed the benefits and limitations of this testing.  Screening tests provide a risk assessment specific to the pregnancy for certain fetal chromosome abnormalities, but cannot definitively diagnose or exclude a fetal chromosome abnormality.  Follow-up genetic counseling and consideration of diagnostic testing is recommended with any abnormal screening result.     Diagnostic tests carry inherent risks- including risk of miscarriage- that require careful consideration.  These tests can detect fetal chromosome abnormalities with greater than 99% certainty.  Results can be compromised by maternal cell contamination or mosaicism, and are limited by the resolution of cytogenetic G-banding technology.  There is no screening nor diagnostic test that can detect all forms of birth defects or mental disability.    It was a pleasure to be involved with Frida [facility]. Face-to-face time of the meeting was 25 minutes.    Augustine Love MS, [facility]  Licensed Genetic Counselor  Phone: [phone]  Pager: [phone]

## 2018-11-16 NOTE — MR AVS SNAPSHOT
"              After Visit Summary   2018    Frida Chaudhary    MRN: [id number]           Patient Information     Date Of Birth          1985        Visit Information        Provider Department      2018 9:30 AM Augustine Love GC [facility]        Today's Diagnoses     Family history of neural tube defect    -  1    Pregnancy related condition, antepartum           Follow-ups after your visit        Who to contact     If you have questions or need follow up information about today's clinic visit or your schedule please contact [facility] directly at [phone].  Normal or non-critical lab and imaging results will be communicated to you by [employer], letter or phone within 4 business days after the clinic has received the results. If you do not hear from us within 7 days, please contact the clinic through [employer] or phone. If you have a critical or abnormal lab result, we will notify you by phone as soon as possible.  Submit refill requests through [employer] or call your pharmacy and they will forward the refill request to us. Please allow 3 business days for your refill to be completed.          Additional Information About Your Visit        MyChart Information     [employer] lets you send messages to your doctor, view your test results, renew your prescriptions, schedule appointments and more. To sign up, go to [email or web address] . Click on \"Log in\" on the left side of the screen, which will take you to the Welcome page. Then click on \"Sign up Now\" on the right side of the page.     You will be asked to enter the access code listed below, as well as some personal information. Please follow the directions to create your username and password.     Your access code is: [email or web address]  Expires: 2019 10:52 AM     Your access code will  in 90 days. If you need help or a new code, please call your [facility] clinic or " [phone].        Care EveryWhere ID     This is your Care EveryWhere ID. This could be used by other organizations to access your [facility] records  [id number]        Your Vitals Were     Last Period                   07/13/2018            Blood Pressure from Last 3 Encounters:   02/09/18 119/74   01/06/17 116/67   01/02/15 116/76    Weight from Last 3 Encounters:   01/04/17 78.5 kg (173 lb)   12/30/14 83.5 kg (184 lb)   12/29/14 84.4 kg (186 lb)              We Performed the Following     [facility] Genetic Counseling        Primary Care Provider Office Phone # Fax #    [facility] [phone] [address] [facility] [address]        Equal Access to Services     LINDA FREED : Hadii aad ku hadasho Sogunnar, waaxda luqadaha, qaybta kaalmada adeegyada, leanne meza . So [facility] [phone].    ATENCIÓN: Si habla español, tiene a cummings disposición servicios gratuitos de asistencia lingüística. Llame al [phone].    We comply with applicable federal civil rights laws and [place] laws. We do not discriminate on the basis of race, color, national origin, age, disability, sex, sexual orientation, or gender identity.            Thank you!     Thank you for choosing [facility]  for your care. Our goal is always to provide you with excellent care. Hearing back from our patients is one way we can continue to improve our services. Please take a few minutes to complete the written survey that you may receive in the mail after your visit with us. Thank you!             Your Updated Medication List - Protect others around you: Learn how to safely use, store and throw away your medicines at www.disposemymeds.org.          This list is accurate as of 11/16/18 12:15 PM.  Always use your most recent med list.                   Brand Name Dispense Instructions for use Diagnosis    ibuprofen 400-800 mg tablet    ADVIL,MOTRIN     90 tablet    Take 1-2 tablets (400-800 mg) by mouth every 6 hours as needed for other (cramping)    Indication for care in labor or delivery       prenatal multivitamin plus iron 27-0.8 MG Tabs per tablet      Take 1 tablet by mouth daily

## 2018-11-16 NOTE — MR AVS SNAPSHOT
"              After Visit Summary   2018    Frida Chaudhary    MRN: [id number]           Patient Information     Date Of Birth          1985        Visit Information        Provider Department      2018 10:45 AM Danie Kemp MD [facility] Maternal Fetal Medicine [facility]        Today's Diagnoses     Family history of neural tube defect    -  1       Follow-ups after your visit        Who to contact     If you have questions or need follow up information about today's clinic visit or your schedule please contact [facility] directly at [phone].  Normal or non-critical lab and imaging results will be communicated to you by ThinkU[employer]art, letter or phone within 4 business days after the clinic has received the results. If you do not hear from us within 7 days, please contact the clinic through [employer] or phone. If you have a critical or abnormal lab result, we will notify you by phone as soon as possible.  Submit refill requests through [employer] or call your pharmacy and they will forward the refill request to us. Please allow 3 business days for your refill to be completed.          Additional Information About Your Visit        MyChart Information     [employer]ron lets you send messages to your doctor, view your test results, renew your prescriptions, schedule appointments and more. To sign up, go to [email or web address] . Click on \"Log in\" on the left side of the screen, which will take you to the Welcome page. Then click on \"Sign up Now\" on the right side of the page.     You will be asked to enter the access code listed below, as well as some personal information. Please follow the directions to create your username and password.     Your access code is: [email or web address]  Expires: 2019 10:52 AM     Your access code will  in 90 days. If you need help or a new code, please call your [place] clinic or [phone].        Care EveryWhere ID     This is your " Care EveryWhere ID. This could be used by other organizations to access your [place] medical records  [id number]        Your Vitals Were     Last Period                   07/13/2018            Blood Pressure from Last 3 Encounters:   02/09/18 119/74   01/06/17 116/67   01/02/15 116/76    Weight from Last 3 Encounters:   01/04/17 78.5 kg (173 lb)   12/30/14 83.5 kg (184 lb)   12/29/14 84.4 kg (186 lb)              Today, you had the following     No orders found for display       Primary Care Provider Office Phone # Fax #    [facility] [phone] [address]  [facility] [id number]        Equal Access to Services     LINDA FREED : Rito condeo Ramiro, waaxda luqadaha, qaybta kaalmada adeegyada, leanne meza . So [facility] [phone].    ATENCIÓN: Si habla español, tiene a cummings disposición servicios gratuitos de asistencia lingüística. [facility] [phone].    We comply with applicable federal civil rights laws and [place] laws. We do not discriminate on the basis of race, color, national origin, age, disability, sex, sexual orientation, or gender identity.            Thank you!     Thank you for choosing [facility]  for your care. Our goal is always to provide you with excellent care. Hearing back from our patients is one way we can continue to improve our services. Please take a few minutes to complete the written survey that you may receive in the mail after your visit with us. Thank you!             Your Updated Medication List - Protect others around you: Learn how to safely use, store and throw away your medicines at www.disposemymeds.org.          This list is accurate as of 11/16/18 10:52 AM.  Always use your most recent med list.                   Brand Name Dispense Instructions for use Diagnosis    ibuprofen 400-800 mg tablet    ADVIL,MOTRIN    90 tablet    Take 1-2 tablets (400-800 mg) by mouth  every 6 hours as needed for other (cramping)    Indication for care in labor or delivery       prenatal multivitamin plus iron 27-0.8 MG Tabs per tablet      Take 1 tablet by mouth daily

## 2018-11-16 NOTE — PROGRESS NOTES
"Please see \"Imaging\" tab under \"Chart Review\" for details of today's US at the [facility].    Danie Kemp MD  Maternal-Fetal Medicine    "

## 2019-03-03 NOTE — ANESTHESIA POSTPROCEDURE EVALUATION
Patient: Frida Chaudhary    LABOR EPIDURAL  Additional Information* No procedures listed *    Diagnosis:* No pre-op diagnosis entered *  Diagnosis Additional Information: labor    Anesthesia Type:  Epidural    Note:  Anesthesia Post Evaluation         Comments:     S/P epidural for labor.   I or my partner was immediately available for management of this patient during epidural analgesia infusion.  VSS.  Doing well. Block resolved.  Neuro at baseline. Denies positional headache. Minimal side effects easily managed w/ PRN meds. No apparent anesthetic complications. No follow-up required.    Jorge Luis Desai MD        Last vitals:  Filed Vitals:    01/05/17 1350 01/05/17 1813 01/06/17 0211   BP: 104/60 109/80 106/72   Temp: 98  F (36.7  C) 98.7  F (37.1  C)    Resp:  16 16       Electronically Signed By: Jorge Luis Desai MD  January 6, 2017  9:02 AM  
stated

## 2019-03-21 LAB — GROUP B STREP PCR: NORMAL

## 2019-04-04 ENCOUNTER — HOSPITAL ENCOUNTER (INPATIENT)
Facility: CLINIC | Age: 34
LOS: 2 days | Discharge: HOME | End: 2019-04-06
Attending: OBSTETRICS & GYNECOLOGY | Admitting: OBSTETRICS & GYNECOLOGY
Payer: OTHER GOVERNMENT

## 2019-04-04 ENCOUNTER — ANESTHESIA (OUTPATIENT)
Dept: OBGYN | Facility: CLINIC | Age: 34
End: 2019-04-04
Payer: OTHER GOVERNMENT

## 2019-04-04 ENCOUNTER — ANESTHESIA EVENT (OUTPATIENT)
Dept: OBGYN | Facility: CLINIC | Age: 34
End: 2019-04-04
Payer: OTHER GOVERNMENT

## 2019-04-04 DIAGNOSIS — D50.9 IRON DEFICIENCY ANEMIA, UNSPECIFIED IRON DEFICIENCY ANEMIA TYPE: Primary | ICD-10-CM

## 2019-04-04 LAB
ABO + RH BLD: NORMAL
ABO + RH BLD: NORMAL
HGB BLD-MCNC: 11.5 G/DL (ref 11.7–15.7)
SPECIMEN EXP DATE BLD: NORMAL

## 2019-04-04 PROCEDURE — G0463 HOSPITAL OUTPT CLINIC VISIT: HCPCS

## 2019-04-04 PROCEDURE — 25000128 H RX IP 250 OP 636: Performed by: OBSTETRICS & GYNECOLOGY

## 2019-04-04 PROCEDURE — 0KQM0ZZ REPAIR PERINEUM MUSCLE, OPEN APPROACH: ICD-10-PCS | Performed by: OBSTETRICS & GYNECOLOGY

## 2019-04-04 PROCEDURE — 25000128 H RX IP 250 OP 636: Performed by: ANESTHESIOLOGY

## 2019-04-04 PROCEDURE — 12000000 ZZH R&B MED SURG/OB

## 2019-04-04 PROCEDURE — 27110038 ZZH RX 271: Performed by: ANESTHESIOLOGY

## 2019-04-04 PROCEDURE — 37000011 ZZH ANESTHESIA WARD SERVICE

## 2019-04-04 PROCEDURE — 25800030 ZZH RX IP 258 OP 636: Performed by: ANESTHESIOLOGY

## 2019-04-04 PROCEDURE — 86901 BLOOD TYPING SEROLOGIC RH(D): CPT | Performed by: OBSTETRICS & GYNECOLOGY

## 2019-04-04 PROCEDURE — 40000671 ZZH STATISTIC ANESTHESIA CASE

## 2019-04-04 PROCEDURE — 86900 BLOOD TYPING SEROLOGIC ABO: CPT | Performed by: OBSTETRICS & GYNECOLOGY

## 2019-04-04 PROCEDURE — 25000132 ZZH RX MED GY IP 250 OP 250 PS 637: Performed by: OBSTETRICS & GYNECOLOGY

## 2019-04-04 PROCEDURE — 72200001 ZZH LABOR CARE VAGINAL DELIVERY SINGLE

## 2019-04-04 PROCEDURE — 3E0R3BZ INTRODUCTION OF ANESTHETIC AGENT INTO SPINAL CANAL, PERCUTANEOUS APPROACH: ICD-10-PCS | Performed by: ANESTHESIOLOGY

## 2019-04-04 PROCEDURE — 00HU33Z INSERTION OF INFUSION DEVICE INTO SPINAL CANAL, PERCUTANEOUS APPROACH: ICD-10-PCS | Performed by: ANESTHESIOLOGY

## 2019-04-04 PROCEDURE — 85018 HEMOGLOBIN: CPT | Performed by: OBSTETRICS & GYNECOLOGY

## 2019-04-04 PROCEDURE — 0064U ANTB TP TOTAL&RPR IA QUAL: CPT | Performed by: OBSTETRICS & GYNECOLOGY

## 2019-04-04 PROCEDURE — 25800030 ZZH RX IP 258 OP 636: Performed by: OBSTETRICS & GYNECOLOGY

## 2019-04-04 PROCEDURE — 10907ZC DRAINAGE OF AMNIOTIC FLUID, THERAPEUTIC FROM PRODUCTS OF CONCEPTION, VIA NATURAL OR ARTIFICIAL OPENING: ICD-10-PCS | Performed by: OBSTETRICS & GYNECOLOGY

## 2019-04-04 PROCEDURE — 25000125 ZZHC RX 250: Performed by: OBSTETRICS & GYNECOLOGY

## 2019-04-04 RX ORDER — OXYTOCIN 10 [USP'U]/ML
10 INJECTION, SOLUTION INTRAMUSCULAR; INTRAVENOUS
Status: DISCONTINUED | OUTPATIENT
Start: 2019-04-04 | End: 2019-04-05

## 2019-04-04 RX ORDER — CARBOPROST TROMETHAMINE 250 UG/ML
250 INJECTION, SOLUTION INTRAMUSCULAR
Status: DISCONTINUED | OUTPATIENT
Start: 2019-04-04 | End: 2019-04-05

## 2019-04-04 RX ORDER — NALOXONE HYDROCHLORIDE 0.4 MG/ML
.1-.4 INJECTION, SOLUTION INTRAMUSCULAR; INTRAVENOUS; SUBCUTANEOUS
Status: DISCONTINUED | OUTPATIENT
Start: 2019-04-04 | End: 2019-04-06 | Stop reason: CLARIF

## 2019-04-04 RX ORDER — NALBUPHINE HYDROCHLORIDE 10 MG/ML
2.5-5 INJECTION, SOLUTION INTRAMUSCULAR; INTRAVENOUS; SUBCUTANEOUS EVERY 6 HOURS PRN
Status: DISCONTINUED | OUTPATIENT
Start: 2019-04-04 | End: 2019-04-06 | Stop reason: CLARIF

## 2019-04-04 RX ORDER — SODIUM CHLORIDE, SODIUM LACTATE, POTASSIUM CHLORIDE, CALCIUM CHLORIDE 600; 310; 30; 20 MG/100ML; MG/100ML; MG/100ML; MG/100ML
INJECTION, SOLUTION INTRAVENOUS CONTINUOUS
Status: DISCONTINUED | OUTPATIENT
Start: 2019-04-04 | End: 2019-04-05

## 2019-04-04 RX ORDER — FENTANYL CITRATE 50 UG/ML
50-100 INJECTION, SOLUTION INTRAMUSCULAR; INTRAVENOUS
Status: DISCONTINUED | OUTPATIENT
Start: 2019-04-04 | End: 2019-04-05

## 2019-04-04 RX ORDER — ONDANSETRON 4 MG/1
4 TABLET, ORALLY DISINTEGRATING ORAL EVERY 6 HOURS PRN
Status: DISCONTINUED | OUTPATIENT
Start: 2019-04-04 | End: 2019-04-05 | Stop reason: CLARIF

## 2019-04-04 RX ORDER — METHYLERGONOVINE MALEATE 0.2 MG/ML
200 INJECTION INTRAVENOUS
Status: DISCONTINUED | OUTPATIENT
Start: 2019-04-04 | End: 2019-04-05

## 2019-04-04 RX ORDER — EPHEDRINE SULFATE 50 MG/ML
5 INJECTION, SOLUTION INTRAMUSCULAR; INTRAVENOUS; SUBCUTANEOUS
Status: DISCONTINUED | OUTPATIENT
Start: 2019-04-04 | End: 2019-04-05 | Stop reason: CLARIF

## 2019-04-04 RX ORDER — LIDOCAINE HYDROCHLORIDE AND EPINEPHRINE 15; 5 MG/ML; UG/ML
3 INJECTION, SOLUTION EPIDURAL
Status: DISCONTINUED | OUTPATIENT
Start: 2019-04-04 | End: 2019-04-05 | Stop reason: CLARIF

## 2019-04-04 RX ORDER — PENICILLIN G POTASSIUM 5000000 [IU]/1
5 INJECTION, POWDER, FOR SOLUTION INTRAMUSCULAR; INTRAVENOUS ONCE
Status: COMPLETED | OUTPATIENT
Start: 2019-04-04 | End: 2019-04-04

## 2019-04-04 RX ORDER — ONDANSETRON 2 MG/ML
4 INJECTION INTRAMUSCULAR; INTRAVENOUS EVERY 6 HOURS PRN
Status: DISCONTINUED | OUTPATIENT
Start: 2019-04-04 | End: 2019-04-05 | Stop reason: CLARIF

## 2019-04-04 RX ORDER — OXYTOCIN/0.9 % SODIUM CHLORIDE 30/500 ML
100-340 PLASTIC BAG, INJECTION (ML) INTRAVENOUS CONTINUOUS PRN
Status: COMPLETED | OUTPATIENT
Start: 2019-04-04 | End: 2019-04-04

## 2019-04-04 RX ORDER — OXYCODONE AND ACETAMINOPHEN 5; 325 MG/1; MG/1
1 TABLET ORAL
Status: DISCONTINUED | OUTPATIENT
Start: 2019-04-04 | End: 2019-04-05

## 2019-04-04 RX ORDER — ACETAMINOPHEN 325 MG/1
650 TABLET ORAL EVERY 4 HOURS PRN
Status: DISCONTINUED | OUTPATIENT
Start: 2019-04-04 | End: 2019-04-05

## 2019-04-04 RX ORDER — NALOXONE HYDROCHLORIDE 0.4 MG/ML
.1-.4 INJECTION, SOLUTION INTRAMUSCULAR; INTRAVENOUS; SUBCUTANEOUS
Status: DISCONTINUED | OUTPATIENT
Start: 2019-04-04 | End: 2019-04-05

## 2019-04-04 RX ORDER — IBUPROFEN 800 MG/1
800 TABLET, FILM COATED ORAL
Status: COMPLETED | OUTPATIENT
Start: 2019-04-04 | End: 2019-04-04

## 2019-04-04 RX ORDER — BUPIVACAINE HCL/0.9 % NACL/PF 0.125 %
PLASTIC BAG, INJECTION (ML) EPIDURAL CONTINUOUS
Status: DISCONTINUED | OUTPATIENT
Start: 2019-04-04 | End: 2019-04-05 | Stop reason: CLARIF

## 2019-04-04 RX ORDER — ONDANSETRON 2 MG/ML
4 INJECTION INTRAMUSCULAR; INTRAVENOUS EVERY 6 HOURS PRN
Status: DISCONTINUED | OUTPATIENT
Start: 2019-04-04 | End: 2019-04-05

## 2019-04-04 RX ADMIN — SODIUM CHLORIDE 2.5 MILLION UNITS: 9 INJECTION, SOLUTION INTRAVENOUS at 19:08

## 2019-04-04 RX ADMIN — Medication: at 18:28

## 2019-04-04 RX ADMIN — SODIUM CHLORIDE, POTASSIUM CHLORIDE, SODIUM LACTATE AND CALCIUM CHLORIDE: 600; 310; 30; 20 INJECTION, SOLUTION INTRAVENOUS at 15:13

## 2019-04-04 RX ADMIN — PENICILLIN G POTASSIUM 5 MILLION UNITS: 5000000 POWDER, FOR SOLUTION INTRAMUSCULAR; INTRAPLEURAL; INTRATHECAL; INTRAVENOUS at 15:13

## 2019-04-04 RX ADMIN — IBUPROFEN 800 MG: 800 TABLET ORAL at 20:54

## 2019-04-04 RX ADMIN — ACETAMINOPHEN 650 MG: 325 TABLET, FILM COATED ORAL at 23:17

## 2019-04-04 RX ADMIN — OXYTOCIN-SODIUM CHLORIDE 0.9% IV SOLN 30 UNIT/500ML 340 ML/HR: 30-0.9/5 SOLUTION at 20:10

## 2019-04-04 ASSESSMENT — ACTIVITIES OF DAILY LIVING (ADL)
DRESS: 0-->INDEPENDENT
SWALLOWING: 0-->SWALLOWS FOODS/LIQUIDS WITHOUT DIFFICULTY
TOILETING: 0-->INDEPENDENT
AMBULATION: 0-->INDEPENDENT
COGNITION: 0 - NO COGNITION ISSUES REPORTED
RETIRED_COMMUNICATION: 0-->UNDERSTANDS/COMMUNICATES WITHOUT DIFFICULTY
TRANSFERRING: 0-->INDEPENDENT
BATHING: 0-->INDEPENDENT
RETIRED_EATING: 0-->INDEPENDENT
FALL_HISTORY_WITHIN_LAST_SIX_MONTHS: NO

## 2019-04-04 ASSESSMENT — MIFFLIN-ST. JEOR: SCORE: 1579.05

## 2019-04-04 NOTE — ANESTHESIA PROCEDURE NOTES
Peripheral nerve/Neuraxial procedure note :  Pre-Procedure  Performed by   Referred by HILARIO  Location:       .       Assessment/Narrative  .  .  . Comments:  Pre-Procedure  Performed by Will Frey MD  Location: OB.      PreAnesthestic Checklist: patient identified, IV checked, risks and benefits discussed, informed consent obtained, monitors and equipment checked, pre-op evaluation and at physician/surgeon's request.    Timeout   Correct Patient: Yes  Correct Procedure: Epidural catheter placement  Correct Site: Yes   Correct Position: Yes    Procedure Documentation  Procedure:   Epidural catheter block for Labor    Patient currently in labor and she and OBMD request a labor epidural to control her labor pains. Patient was interviewed and examined. Procedure and risks including but not limited to bleeding, infection, nerve injury, paralysis, PDPH, and inadequate block requiring intervention discussed with patient. Questions answered. This epidural is to be placed in anticipation of vaginal delivery.  She consents to the epidural procedure.  Time-out was performed.  I or my partners remain immediately available for management of any issues or complications and will monitor at appropriate intervals.  Procedure: Patient sitting. Betadine prep x 3. Sterile drape applied.  Mask and gloves used.  Lidocaine 1%  local infiltration at L 3-4.  17 G. Tuohy needle at L3-4 by loss of resistance into epidural space.  No CSF, paresthesia or blood. 1.5 % Lidocaine with 1:200,000 Epinephrine 5cc test dose. Epidural catheter inserted w/o resistance to 5 cm in epidural space. Then 0.125% bupivicaine 15 cc with NS 5 cc.  Aspiration negative for blood and CSF.   Negative for neuro change, paresthesia or symptoms of intravascular injection or intrathecal injection.  Infusion orders written and infusion of 0.125% bupivicaine 15cc per hour started.    Will Frey MD

## 2019-04-04 NOTE — ANESTHESIA PREPROCEDURE EVALUATION
"Anesthesia Pre-Procedure Evaluation    Patient: Frida Chaudhary   MRN: [id number] : 1985          Preoperative Diagnosis: * No pre-op diagnosis entered *    * No procedures listed *    Past Medical History:   Diagnosis Date     Abnormal Pap smear     years ago, fine since     Herpes simplex without mention of complication     taking valtrex     Scoliosis      Past Surgical History:   Procedure Laterality Date     D & C       DENTAL SURGERY       KNEE SURGERY       Anesthesia Evaluation       history and physical reviewed .             ROS/MED HX    ENT/Pulmonary:  - neg pulmonary ROS     Neurologic:       Cardiovascular:  - neg cardiovascular ROS       METS/Exercise Tolerance:     Hematologic:         Musculoskeletal:         GI/Hepatic:         Renal/Genitourinary:         Endo:         Psychiatric:         Infectious Disease:         Malignancy:         Other:                     neg OB ROS            Physical Exam      Airway   Mallampati: II  TM distance: > 3 FB  Neck ROM: full    Dental     Cardiovascular       Pulmonary             Lab Results   Component Value Date    HGB 11.5 (L) 2019       Preop Vitals  BP Readings from Last 3 Encounters:   19 117/69   18 119/74   17 116/67    Pulse Readings from Last 3 Encounters:   18 75   17 67   01/01/15 80      Resp Readings from Last 3 Encounters:   19 20   18 18   17 16    SpO2 Readings from Last 3 Encounters:   18 99%   05/10/14 100%      Temp Readings from Last 1 Encounters:   19 98.3  F (36.8  C) (Oral)    Ht Readings from Last 1 Encounters:   19 1.727 m (5' 8\")      Wt Readings from Last 1 Encounters:   19 82.6 kg (182 lb)    Estimated body mass index is 27.67 kg/m  as calculated from the following:    Height as of this encounter: 1.727 m (5' 8\").    Weight as of this encounter: 82.6 kg (182 lb).       Anesthesia Plan      History & Physical Review      ASA Status:  2 .  "            Postoperative Care      Consents  Anesthetic plan, risks, benefits and alternatives discussed with:  Patient..                 Will Frey MD                    .

## 2019-04-04 NOTE — PROVIDER NOTIFICATION
04/04/19 1433   Provider Notification   Provider Name/Title Dr Atkinson   Method of Notification Phone   Request Evaluate - Remote   Notification Reason Patient Arrived;SVE;Status Update     Dr Atkinson updated that patient arrived for rule out labor. Sy was a FT dilated yesterday in the clinic and is now 4/70/-2 with a bulging bag. GBS positive. MD states to admit patient to inpatient, intrapartum orders received, PCN for GBS, pt may have nitrous, fentanyl, epidural PRN.

## 2019-04-04 NOTE — PLAN OF CARE
Reactive strip. Pt would like to ambulate hallways. Monitors removed. Pt instructed to stay on the 4th floor and come back to the room at 1600 or sooner if any concerns.

## 2019-04-04 NOTE — PLAN OF CARE
Data: Patient presented to [facility]: 2019  2:10 PM.  Reason for maternal/fetal assessment is uterine contractions. Patient reports that she started dl q6 last night and has gotten increasingly more uncomfortable.  Patient is a .  Prenatal record reviewed. Pregnancy  has been complicated by has been uncomplicated.  Gestational Age 38w5d. VSS. Fetal movement present. Patient denies leaking of vaginal fluid/rupture of membranes, vaginal bleeding, pelvic pressure, nausea, vomiting, headache, visual disturbances, epigastric or URQ pain, significant edema. Support person is present.   Action: Verbal consent for EFM. Triage assessment completed. Bill of rights reviewed.  Response: Patient verbalized agreement with plan. Will contact Dr Tarah Atkinson with update and further orders.

## 2019-04-04 NOTE — H&P
"OB Brief Admit H&P    No significant change in general health status based on examination of the patient, review of Nursing Admission Database and prenatal record.    Pt is a 33 year old  @ 38w5d who presented to L&D with regular painful contractions.    Patient's prenatal course has been complicated by:   1. GBS positive  2. H/o HSV, taking valtrex prophylaxis  3. H/o PP anxiety, plans to start Zoloft prior to discharge    Prenatal Labs:    Blood type A+  Rubella immune  GCT 84  GBS positive    EFW: 7lb    /81   Temp 98.8  F (37.1  C) (Oral)   Resp 20   Ht 1.727 m (5' 8\")   Wt 82.6 kg (182 lb)   LMP 2018   BMI 27.67 kg/m    EFM:  Baseline 130, moderate variability, + accels, no decels, cat I  [place]: every 2-8min  SVE: 6/90%/-1  Membranes:  AROM, meconium    Assessment:  33 year old  @ 38w5d admitted for spontaneous labor    Plan:  1. Admit to labor and delivery   2. PCN started at 3pm.   3. AROM now, plans epidural shortly, augment in Pitocin if needed  4. Plan to restart Zoloft prior to discharge    Tarah Atkinson  2019  5:38 PM      "

## 2019-04-05 LAB
HGB BLD-MCNC: 9.6 G/DL (ref 11.7–15.7)
T PALLIDUM AB SER QL: NONREACTIVE

## 2019-04-05 PROCEDURE — 36415 COLL VENOUS BLD VENIPUNCTURE: CPT | Performed by: OBSTETRICS & GYNECOLOGY

## 2019-04-05 PROCEDURE — 25000132 ZZH RX MED GY IP 250 OP 250 PS 637: Performed by: OBSTETRICS & GYNECOLOGY

## 2019-04-05 PROCEDURE — 12000000 ZZH R&B MED SURG/OB

## 2019-04-05 PROCEDURE — 85018 HEMOGLOBIN: CPT | Performed by: OBSTETRICS & GYNECOLOGY

## 2019-04-05 RX ORDER — AMOXICILLIN 250 MG
2 CAPSULE ORAL 2 TIMES DAILY
Status: DISCONTINUED | OUTPATIENT
Start: 2019-04-05 | End: 2019-04-06 | Stop reason: HOSPADM

## 2019-04-05 RX ORDER — OXYTOCIN/0.9 % SODIUM CHLORIDE 30/500 ML
100 PLASTIC BAG, INJECTION (ML) INTRAVENOUS CONTINUOUS
Status: DISCONTINUED | OUTPATIENT
Start: 2019-04-05 | End: 2019-04-06 | Stop reason: CLARIF

## 2019-04-05 RX ORDER — OXYTOCIN 10 [USP'U]/ML
10 INJECTION, SOLUTION INTRAMUSCULAR; INTRAVENOUS
Status: DISCONTINUED | OUTPATIENT
Start: 2019-04-05 | End: 2019-04-06 | Stop reason: HOSPADM

## 2019-04-05 RX ORDER — NALOXONE HYDROCHLORIDE 0.4 MG/ML
.1-.4 INJECTION, SOLUTION INTRAMUSCULAR; INTRAVENOUS; SUBCUTANEOUS
Status: DISCONTINUED | OUTPATIENT
Start: 2019-04-05 | End: 2019-04-06 | Stop reason: HOSPADM

## 2019-04-05 RX ORDER — LANOLIN 100 %
OINTMENT (GRAM) TOPICAL
Status: DISCONTINUED | OUTPATIENT
Start: 2019-04-05 | End: 2019-04-06 | Stop reason: HOSPADM

## 2019-04-05 RX ORDER — AMOXICILLIN 250 MG
1 CAPSULE ORAL 2 TIMES DAILY
Status: DISCONTINUED | OUTPATIENT
Start: 2019-04-05 | End: 2019-04-06 | Stop reason: HOSPADM

## 2019-04-05 RX ORDER — IBUPROFEN 800 MG/1
800 TABLET, FILM COATED ORAL EVERY 6 HOURS PRN
Status: DISCONTINUED | OUTPATIENT
Start: 2019-04-05 | End: 2019-04-06 | Stop reason: HOSPADM

## 2019-04-05 RX ORDER — OXYTOCIN/0.9 % SODIUM CHLORIDE 30/500 ML
340 PLASTIC BAG, INJECTION (ML) INTRAVENOUS CONTINUOUS PRN
Status: DISCONTINUED | OUTPATIENT
Start: 2019-04-05 | End: 2019-04-06 | Stop reason: HOSPADM

## 2019-04-05 RX ORDER — HYDROCORTISONE 2.5 %
CREAM (GRAM) TOPICAL 3 TIMES DAILY PRN
Status: DISCONTINUED | OUTPATIENT
Start: 2019-04-05 | End: 2019-04-06 | Stop reason: HOSPADM

## 2019-04-05 RX ORDER — BISACODYL 10 MG
10 SUPPOSITORY, RECTAL RECTAL DAILY PRN
Status: DISCONTINUED | OUTPATIENT
Start: 2019-04-06 | End: 2019-04-06 | Stop reason: HOSPADM

## 2019-04-05 RX ORDER — ACETAMINOPHEN 325 MG/1
650 TABLET ORAL EVERY 4 HOURS PRN
Status: DISCONTINUED | OUTPATIENT
Start: 2019-04-05 | End: 2019-04-06 | Stop reason: HOSPADM

## 2019-04-05 RX ADMIN — ACETAMINOPHEN 650 MG: 325 TABLET, FILM COATED ORAL at 14:42

## 2019-04-05 RX ADMIN — IBUPROFEN 800 MG: 800 TABLET ORAL at 09:44

## 2019-04-05 RX ADMIN — ACETAMINOPHEN 650 MG: 325 TABLET, FILM COATED ORAL at 08:25

## 2019-04-05 RX ADMIN — IBUPROFEN 800 MG: 800 TABLET ORAL at 03:14

## 2019-04-05 RX ADMIN — SENNOSIDES AND DOCUSATE SODIUM 1 TABLET: 8.6; 5 TABLET ORAL at 08:24

## 2019-04-05 RX ADMIN — SENNOSIDES AND DOCUSATE SODIUM 1 TABLET: 8.6; 5 TABLET ORAL at 20:18

## 2019-04-05 RX ADMIN — ACETAMINOPHEN 650 MG: 325 TABLET, FILM COATED ORAL at 03:14

## 2019-04-05 RX ADMIN — ACETAMINOPHEN 650 MG: 325 TABLET, FILM COATED ORAL at 20:18

## 2019-04-05 RX ADMIN — IBUPROFEN 800 MG: 800 TABLET ORAL at 20:18

## 2019-04-05 NOTE — PROGRESS NOTES
Public Health Nurse (PHN) met with patient, discussed resources within [facility].  Provided family resources of [facility] Public Health services resource card, home visiting card, community resource guide and car seat information card given and discussed.  Family is aware how to add baby to insurance and have a primary provider arranged for baby.  Offered referral for home visiting, family declined. Patient declined any questions or concerns.

## 2019-04-05 NOTE — PLAN OF CARE
Pt VSS. Void and stooled this shift. Tolerating regular diet well. Ambulating independently. Cramping pain well controlled with PRN tylenol, ibuprofen and ice packs. Bonding well with baby, independent in feeding and cares.  Angel present at bedside, attentive to patient and baby. Hemoglobin was 9.6, pt is asymptomatic, MD aware, will begin oral iron after discharge.

## 2019-04-05 NOTE — PLAN OF CARE
Pt up to bathroom with standby assist in labor room. Passed level 4 bmat test. Unable to void, assist with william care. Tried again to void in room 450, unable to void. Pt able to transfer easily to bed.

## 2019-04-05 NOTE — ADDENDUM NOTE
Addendum  created 04/04/19 1925 by Will Frey MD    Intraprocedure Event edited, Sign clinical note

## 2019-04-05 NOTE — PROGRESS NOTES
"OB Post-partum Note  PPD# 1    S:  Patient doing well.  Pain controlled.  Voiding.  Bleeding is normal.  Breast feeding. Baby [facility].    O:  /69   Pulse 63   Temp 97.7  F (36.5  C) (Oral)   Resp 16   Ht 1.727 m (5' 8\")   Wt 82.6 kg (182 lb)   LMP 2018   Breastfeeding? Unknown   BMI 27.67 kg/m    Gen- A&O, NAD  Abd- Non-tender, fundus non tender and firm   Ext- non-tender, no calf pain    Hemoglobin   Date Value Ref Range Status   2019 9.6 (L) 11.7 - 15.7 g/dL Final     A +  Rubella Immune    A/P: 33 year old  PPD# 1 s/p     1.  Routine post-partum cares  2.  Acute blood loss anemia: asymptomatic, will monitor and discharge with iron  3.  Discharge PPD#2  4.  The plan of care was discussed with the patient.  She expressed understanding and agreement    Tarah Atkinson  2019  11:28 AM  "

## 2019-04-05 NOTE — L&D DELIVERY NOTE
Delivery Date:  2019      HISTORY:  The patient is a 33-year-old  5, para 2, at 38 and 5/7 weeks gestation admitted to Labor and Delivery with painful contractions this afternoon.  Prenatal course was complicated by group B strep positive and she was treated with penicillin in labor.  She has a history of HSV and has is taking Valtrex prophylactically and has no current prodromal or lesions.  She has a history of postpartum anxiety and plans to start Zoloft prior to discharge.  On admission, she was 4 cm, 60%, vertex -1 posterior and dl every 7-8 minutes.  This was a change from the office yesterday when she was 1 cm.  Heart tones were category 1.  She ambulated and progressed to 6 cm, 90%, -1 at 1730.  The patient requested rupture of membranes prior to receiving epidural, as she was concerned about epidural slowing her progress.  Amniotomy was performed at 1730 with meconium-stained fluid.  I assumed care after that time.  Spontaneous labor continued and heart tones remained category 1.  She progressed to complete dilation at 1930 and was feeling pressure, and was set up to begin pushing.  The patient gradually brought the vertex to a full crown.  The NNP was in the room due to the meconium-stained fluid.  Heart tones remained reassuring.  At , she spontaneously delivered a vigorous female infant from the direct OP position.  There was a nuchal cord which was reduced over the head.  The rest of the baby was delivered without difficulty.  Baby was first placed on the maternal abdomen and was immediately crying and therefore remained on the maternal abdomen.  The mouth and nares were suctioned.  After delay of 1 minute, the cord was doubly clamped and cut and the infant remained on the maternal abdomen.  Apgars were 9 at 1 minute and 9 at 5 minutes.  The weight is pending at the time of this dictation.  The placenta delivered spontaneously at 2011 hours, was intact with 3-cord vessels.   Pitocin was placed in the IV bag following delivery of the placenta.  Cervix and vagina were inspected, had no laceration.  There was a posterior midline second-degree tear, which was somewhat jagged and was repaired in the usual fashion with 3-0 Vicryl suture and deep sutures to the perineal body.  Both mother and baby doing well following delivery.  All sponge and needle counts were correct.  EBL was 257 mL.         MICHELLE RICH MD             D: 2019   T: 2019   MT: PATEL      Name:     CHET WHITE   MRN:      [id number]        Account:        [id number]   :      1985        Delivery Date:  2019               Document: [id number]       cc: Michelle Rich MD

## 2019-04-05 NOTE — PLAN OF CARE
Pt meeting expected goals for shift. Positive attachment behaviors noted with . Pain managed with PRN ibuprofen and tylenol - See MAR. T-pump given but not utilized yet this shift. Mobility score 4. Support person Angel at bedside and supportive . Will continue to monitor and adjust plan as needed.

## 2019-04-05 NOTE — ANESTHESIA POSTPROCEDURE EVALUATION
S/P epidural for labor.   I or my partner was immediately available for management of this patient during epidural analgesia infusion.  VSS.  Doing well. Block resolved.  Neuro at baseline. Denies positional headache. Minimal side effects easily managed w/ PRN meds. No apparent anesthetic complications. No follow-up required.    Jose Max, MDPatient: Frida Chaudhary    * No procedures listed *    Diagnosis:* No pre-op diagnosis entered *  Diagnosis Additional Information: Labor pain    Anesthesia Type:  Epidural    Note:  Anesthesia Post Evaluation    Last vitals:  Vitals:    04/04/19 2159 04/04/19 2214 04/04/19 2346   BP: 111/73 109/55 102/66   Resp:   18   Temp:   97.9  F (36.6  C)         Electronically Signed By: Jose Max MD  April 5, 2019  7:11 AM

## 2019-04-06 VITALS
HEIGHT: 68 IN | TEMPERATURE: 97.5 F | SYSTOLIC BLOOD PRESSURE: 105 MMHG | RESPIRATION RATE: 16 BRPM | HEART RATE: 67 BPM | DIASTOLIC BLOOD PRESSURE: 56 MMHG | WEIGHT: 182 LBS | BODY MASS INDEX: 27.58 KG/M2

## 2019-04-06 PROCEDURE — 25000132 ZZH RX MED GY IP 250 OP 250 PS 637: Performed by: OBSTETRICS & GYNECOLOGY

## 2019-04-06 RX ORDER — ACETAMINOPHEN 325 MG/1
650 TABLET ORAL EVERY 4 HOURS PRN
Qty: 60 TABLET | Refills: 0 | Status: SHIPPED | OUTPATIENT
Start: 2019-04-06

## 2019-04-06 RX ORDER — IBUPROFEN 600 MG/1
600 TABLET, FILM COATED ORAL EVERY 6 HOURS PRN
Qty: 40 TABLET | Refills: 0 | Status: ON HOLD | OUTPATIENT
Start: 2019-04-06 | End: 2022-12-27

## 2019-04-06 RX ORDER — AMOXICILLIN 250 MG
1 CAPSULE ORAL 2 TIMES DAILY PRN
Qty: 60 TABLET | Refills: 0 | Status: SHIPPED | OUTPATIENT
Start: 2019-04-06 | End: 2021-04-04

## 2019-04-06 RX ORDER — FERROUS SULFATE 325(65) MG
325 TABLET, DELAYED RELEASE (ENTERIC COATED) ORAL DAILY
Qty: 30 TABLET | Refills: 1 | Status: SHIPPED | OUTPATIENT
Start: 2019-04-06 | End: 2021-04-04

## 2019-04-06 RX ORDER — SERTRALINE HYDROCHLORIDE 25 MG/1
25 TABLET, FILM COATED ORAL DAILY
Qty: 30 TABLET | Refills: 11 | Status: ON HOLD | OUTPATIENT
Start: 2019-04-06 | End: 2022-12-27

## 2019-04-06 RX ADMIN — IBUPROFEN 800 MG: 800 TABLET ORAL at 07:38

## 2019-04-06 RX ADMIN — ACETAMINOPHEN 650 MG: 325 TABLET, FILM COATED ORAL at 00:17

## 2019-04-06 RX ADMIN — SENNOSIDES AND DOCUSATE SODIUM 1 TABLET: 8.6; 5 TABLET ORAL at 09:41

## 2019-04-06 NOTE — PLAN OF CARE
Pt able to get some rest between cares w/  rooming-in for night.  States ordered pain medications keeping her comfortable.  Independent w/ self and baby cares; no support person present this shift.

## 2019-04-06 NOTE — DISCHARGE INSTRUCTIONS
Postpartum Vaginal Delivery Instructions  Return to clinic in 2 weeks and 6 weeks for follow up.   Lactation [phone]    Activity       Ask family and friends for help when you need it.    Do not place anything in your vagina for 6 weeks.    You are not restricted on other activities, but take it easy for a few weeks to allow your body to recover from delivery.  You are able to do any activities you feel up to that point.    No driving until you have stopped taking your pain medications (usually two weeks after delivery).     Call your health care provider if you have any of these symptoms:       Increased pain, swelling, redness, or fluid around your stiches from an episiotomy or perineal tear.    A fever above 100.4 F (38 C) with or without chills when placing a thermometer under your tongue.    You soak a sanitary pad with blood within 1 hour, or you see blood clots larger than a golf ball.    Bleeding that lasts more than 6 weeks.    Vaginal discharge that smells bad.    Severe pain, cramping or tenderness in your lower belly area.    A need to urinate more frequently (use the toilet more often), more urgently (use the toilet very quickly), or it burns when you urinate.    Nausea and vomiting.    Redness, swelling or pain around a vein in your leg.    Problems breastfeeding or a red or painful area on your breast.    Chest pain and cough or are gasping for air.    Problems coping with sadness, anxiety, or depression.  If you have any concerns about hurting yourself or the baby, call your provider immediately.     You have questions or concerns after you return home.     Keep your hands clean:  Always wash your hands before touching your perineal area and stitches.  This helps reduce your risk of infection.  If your hands aren't dirty, you may use an alcohol hand-rub to clean your hands. Keep your nails clean and short.

## 2019-04-06 NOTE — PROGRESS NOTES
Data: Vital signs within normal limits. Postpartum checks within normal limits - see flow record. Patient eating and drinking normally. Patient able to empty bladder independently and is up ambulating. No apparent signs of infection.Patient performing self cares and is able to care for infant.  Action: Patient medicated during the shift with ibuprofen. See MAR. Patient reassessed within 1 hour after each medication and pain was improved - patient stated she was comfortable. Patient education completed. See flow record.  Response: Positive attachment behaviors observed with infant. Father of infant present and attentive to both mother and infant needs.  Plan: Discharged to home today at 12:10 with all patient belongings. AVS read to patient, all questions and concerns addressed. Will follow up in clinic in two weeks and six weeks.

## 2019-04-06 NOTE — PLAN OF CARE
Doing well with self and infant cares, breast feeding independently. Ibuprofen for pain with stated relief, denies difficulty voiding. FOB present and supportive, involved in infant cares.

## 2019-04-06 NOTE — PROGRESS NOTES
Post-partum Note      S: Patient is doing well today.  Pain is controlled with PO medications.  Tolerating regular diet without nausea or vomiting.  Ambulating without dizziness.  Urinating without difficulty. Lochia normal.  Breastfeeding.    O:   Patient Vitals for the past 24 hrs:   BP Temp Temp src Pulse Heart Rate Resp   19 0739 105/56 97.5  F (36.4  C) Oral 67 -- 16   19 0128 109/55 98.3  F (36.8  C) Oral -- 75 16   19 1600 110/63 98.1  F (36.7  C) Oral 76 -- 18   19 0944 107/69 97.7  F (36.5  C) Oral 63 -- 16     Gen:  Resting comfortably, NAD  Pulm:  Breathing comfortably on room air  Abd:  Soft, appropriately ttp, non-distended.Fundus at 2 cm below umbilicus, firm and non-tender.  Ext:  non-tender, trace bilateral LE edema    Hgb:   Hemoglobin   Date Value Ref Range Status   2019 9.6 (L) 11.7 - 15.7 g/dL Final       Assessment/Plan:  33 year old  on PPD #2 s/p .  1. Continue with routine postpartum management  2. Hx anxiety: plans to start zoloft on discharge. Will give medication to go home with.   3. Mild iron deficiency anemia exacerbated by expected blood loss at delivery. Asymptomatic. Will need iron supplementation.   4. Rh: Positive  5. Feed: Breastfeeding  Dispo: DC home today. Warning signs reviewed. Follow-up in 2 weeks for mood check and 6 weeks for postpartum visit.    MD Murphy [facility]inN[facility]  2019, 9:24 AM

## 2021-04-04 ENCOUNTER — OFFICE VISIT (OUTPATIENT)
Dept: URGENT CARE | Facility: URGENT CARE | Age: 36
End: 2021-04-04
Payer: MEDICAID

## 2021-04-04 VITALS
TEMPERATURE: 98.6 F | OXYGEN SATURATION: 100 % | SYSTOLIC BLOOD PRESSURE: 128 MMHG | HEART RATE: 109 BPM | DIASTOLIC BLOOD PRESSURE: 82 MMHG

## 2021-04-04 DIAGNOSIS — S05.11XA CONTUSION OF RIGHT EYE, INITIAL ENCOUNTER: ICD-10-CM

## 2021-04-04 DIAGNOSIS — S09.90XA HEAD INJURY, INITIAL ENCOUNTER: Primary | ICD-10-CM

## 2021-04-04 DIAGNOSIS — S00.83XA FACIAL CONTUSION, INITIAL ENCOUNTER: ICD-10-CM

## 2021-04-04 PROCEDURE — 99204 OFFICE O/P NEW MOD 45 MIN: CPT | Performed by: PHYSICIAN ASSISTANT

## 2021-04-04 NOTE — PATIENT INSTRUCTIONS
"Please call [phone] to schedule CT scan.    Patient Education     Facial Bruise (Contusion)  A bruise (contusion) happens when small blood vessels break open and leak blood into the nearby area. This can happen from a bump, hit, or fall. This may happen during sports, an accident, or during a fight. Symptoms often include changes in skin color (bruising), swelling, and pain.    The swelling from the bruise should decrease in a few days. Bruising and pain may take several weeks to go away.    Home care    If you have been prescribed medicines for pain, take them as directed.    To help reduce swelling and pain, wrap a cold pack or bag of frozen peas in a thin towel. Put it on the injured area for up to 20 minutes. Do this a few times a day until the swelling goes down.     If you have scrapes or cuts on your face requiring stitches or other closures, care for them as directed.    For the next 24 hours (or longer if instructed):  ? Don t drink alcohol, or use sedatives or medicines that make you sleepy.  ? Don t drive or operate machinery.  ? Don't do anything strenuous. Don t lift or strain.  ? Don't return to sports or other activity that could result in another head injury.    Note about concussions  Because the injury was to your head, it's possible that a concussion (mild brain injury) could result. Symptoms of a concussion can show up later. Be alert for signs and symptoms of a concussion. Seek emergency medical care if any of these develop over the next hours to days:     Headache    Nausea or vomiting    Dizziness    Sensitivity to light or noise    Unusual sleepiness or grogginess    Trouble falling asleep    Personality changes    Vision changes    Memory loss    Confusion    Trouble walking or clumsiness    Loss of consciousness (even for a short time)    Inability to be awakened    Feeling \"off\" or slow as if in a daze  Follow-up care  Follow up with your healthcare provider, or as directed.  When to " seek medical advice  Call your healthcare provider right away if any of these occur:    Swelling or pain that gets worse, not better    New swelling or pain    Warmth or drainage from the swollen area or from cuts or scrapes    Fluid drainage or bleeding from the nose or ears    Fever of 100.4 F (38 C) or higher, or as directed by your healthcare provider  Call 911  Call 911 if any of the following occur:      Repeated vomiting    Unusual drowsiness or trouble awakening    Fainting or loss of consciousness    Seizure    Worsening confusion, memory loss, dizziness, headache, behavior, speech, or vision  [email or web address] last reviewed this educational content on 12/1/2019 2000-2020 [employer]. All rights reserved. This information is not intended as a substitute for professional medical care. Always follow your healthcare professional's instructions.           Patient Education     * Head Injury (Adult)    You have a head injury. It doesn t look serious right now. But symptoms of a more serious problem may appear later. This could be a mild brain injury (concussion), or bruising or bleeding in the brain. You or someone caring for you will need to watch for the symptoms listed below for at least the next 24 hours. When you get home, be sure to follow any care instructions you re given.  Home care  Watch for the following symptoms:  Call 9-1-1 if you have any of these symptoms over the next hours or days:  1. Severe headache or headache that gets worse  2. Nausea and repeated throwing up (vomiting)  3. Dizziness or changes in eyesight (vision changes)  4. Bothered by bright light or loud noise  5. Sleep changes (trouble falling asleep or unusually sleepy or groggy)  6. Changes in the way you act or talk (personality or speech changes)  7. Feeling confused or forgetting things (memory loss)  8. Trouble walking or clumsiness  9. Passing out or fainting (even for a short time)  10. Won t wake up  11. Stiff  neck  12. Weakness or numbness in any part of the body  13. Seizures  Do you have signs of a concussion?  A concussion is an injury to the brain caused by shaking. If you were knocked out, that s a sign you may have a concussion. But watch for these signs, too:    Upset stomach (nausea)    Throwing up (vomiting)    Feeling dizzy or confused    Headache    Loss of memory  If you have any of the above signs:    Don t return to sports or any activity where you might hurt your head again.    Wait until all symptoms have been gone for 2 full weeks, and your doctor has cleared you to return to sports.  You could get a serious brain injury if you get hurt again before fully recovering.  General care    You don t need to stay awake or be woken during the night.    For pain, you may use:  ? Tylenol (acetaminophen) 650 to 1000 mg every 6 hours OR  ? Motrin or Advil (ibuprofen) 600 mg every 6 to 8 hours with food  NOTE: If you have chronic liver or kidney disease or ever had a stomach ulcer or GI bleeding, talk with your doctor before using these medicines.    Don t take aspirin after a head injury.    For swelling and to help with pain: Put a cold source to the injured area for up to 20 minutes at a time. Do this as often as directed. Use a cold pack or bag of ice wrapped in a thin towel. Never put a cold source directly on the skin.    For cuts and scrapes: Care for them as the doctor or nurse directed.    For the next 24 hours (or longer, if your doctor advises it):  ? Don t drink alcohol, use sedatives, or use other medicines that make you sleepy.  ? Don t drive or use large machines.  ? Don t do anything tiring, such as heavy lifting or straining.  ? Limit tasks where you need to focus or concentrate. This includes reading, using a smartphone or computer, watching TV and playing video games.  ? Don t return to sports or other activities that could cause another head injury.  Follow-up care  Follow up with your doctor if  symptoms don t get better after 24 hours, or as directed.  When to call the doctor  Call your doctor right away if any of these occur:    Pain doesn t get better or gets worse    New or increased swelling or bruising    Fever of 100.4 F (38 C) or higher, or as directed by your doctor    Increased redness, warmth, draining or bleeding from the injury    Fluid drainage or bleeding from the nose or ears    Any pushed-in spot or bony bump in the injured area  This information has been modified by your health care provider with permission from the publisher.  Modifications clinically reviewed by Aidan Larose DO, MBA, SYLVIA, Director of Physician Informatics for Emergency Medicine, [facility] on 8/20/18.  For informational purposes only. Not to replace the advice of your health care provider.  Copyright   2018 [facility]. All rights reserved.
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
## 2021-04-10 ENCOUNTER — HEALTH MAINTENANCE LETTER (OUTPATIENT)
Age: 36
End: 2021-04-10

## 2021-04-11 ENCOUNTER — TELEPHONE (OUTPATIENT)
Dept: BEHAVIORAL HEALTH | Facility: CLINIC | Age: 36
End: 2021-04-11

## 2021-04-11 ENCOUNTER — HOSPITAL ENCOUNTER (INPATIENT)
Facility: CLINIC | Age: 36
LOS: 2 days | Discharge: HOME | End: 2021-04-13
Attending: INTERNAL MEDICINE | Admitting: PSYCHIATRY & NEUROLOGY
Payer: MEDICAID

## 2021-04-11 DIAGNOSIS — F10.239 ALCOHOL DEPENDENCE WITH WITHDRAWAL WITH COMPLICATION (H): ICD-10-CM

## 2021-04-11 DIAGNOSIS — Z11.52 ENCOUNTER FOR SCREENING LABORATORY TESTING FOR SEVERE ACUTE RESPIRATORY SYNDROME CORONAVIRUS 2 (SARS-COV-2): ICD-10-CM

## 2021-04-11 DIAGNOSIS — F10.10 ALCOHOL ABUSE: ICD-10-CM

## 2021-04-11 LAB
ALBUMIN SERPL-MCNC: 3.7 G/DL (ref 3.4–5)
ALCOHOL BREATH TEST: 0.07 (ref 0–0.01)
ALP SERPL-CCNC: 49 U/L (ref 40–150)
ALT SERPL W P-5'-P-CCNC: 32 U/L (ref 0–50)
AMPHETAMINES UR QL SCN: NEGATIVE
ANION GAP SERPL CALCULATED.3IONS-SCNC: 12 MMOL/L (ref 3–14)
AST SERPL W P-5'-P-CCNC: 61 U/L (ref 0–45)
BARBITURATES UR QL: NEGATIVE
BASOPHILS # BLD AUTO: 0.1 10E9/L (ref 0–0.2)
BASOPHILS NFR BLD AUTO: 2.6 %
BENZODIAZ UR QL: NEGATIVE
BILIRUB SERPL-MCNC: 0.6 MG/DL (ref 0.2–1.3)
BUN SERPL-MCNC: 6 MG/DL (ref 7–30)
CALCIUM SERPL-MCNC: 8.4 MG/DL (ref 8.5–10.1)
CANNABINOIDS UR QL SCN: NEGATIVE
CHLORIDE SERPL-SCNC: 101 MMOL/L (ref 94–109)
CO2 SERPL-SCNC: 26 MMOL/L (ref 20–32)
COCAINE UR QL: NEGATIVE
CREAT SERPL-MCNC: 0.57 MG/DL (ref 0.52–1.04)
DIFFERENTIAL METHOD BLD: ABNORMAL
EOSINOPHIL # BLD AUTO: 0 10E9/L (ref 0–0.7)
EOSINOPHIL NFR BLD AUTO: 0 %
ERYTHROCYTE [DISTWIDTH] IN BLOOD BY AUTOMATED COUNT: 13.8 % (ref 10–15)
ETHANOL UR QL SCN: POSITIVE
GFR SERPL CREATININE-BSD FRML MDRD: >90 ML/MIN/{1.73_M2}
GLUCOSE SERPL-MCNC: 102 MG/DL (ref 70–99)
HCG UR QL: NEGATIVE
HCT VFR BLD AUTO: 34 % (ref 35–47)
HGB BLD-MCNC: 11.9 G/DL (ref 11.7–15.7)
LABORATORY COMMENT REPORT: NORMAL
LYMPHOCYTES # BLD AUTO: 0.6 10E9/L (ref 0.8–5.3)
LYMPHOCYTES NFR BLD AUTO: 15.7 %
MCH RBC QN AUTO: 33.7 PG (ref 26.5–33)
MCHC RBC AUTO-ENTMCNC: 35 G/DL (ref 31.5–36.5)
MCV RBC AUTO: 96 FL (ref 78–100)
MONOCYTES # BLD AUTO: 0 10E9/L (ref 0–1.3)
MONOCYTES NFR BLD AUTO: 0.9 %
NEUTROPHILS # BLD AUTO: 3.2 10E9/L (ref 1.6–8.3)
NEUTROPHILS NFR BLD AUTO: 80.8 %
OPIATES UR QL SCN: NEGATIVE
PLATELET # BLD AUTO: 219 10E9/L (ref 150–450)
PLATELET # BLD EST: ABNORMAL 10*3/UL
POIKILOCYTOSIS BLD QL SMEAR: SLIGHT
POTASSIUM SERPL-SCNC: 2.9 MMOL/L (ref 3.4–5.3)
PROT SERPL-MCNC: 7.3 G/DL (ref 6.8–8.8)
RBC # BLD AUTO: 3.53 10E12/L (ref 3.8–5.2)
SARS-COV-2 RNA RESP QL NAA+PROBE: NEGATIVE
SODIUM SERPL-SCNC: 139 MMOL/L (ref 133–144)
SPECIMEN SOURCE: NORMAL
STOMATOCYTES BLD QL SMEAR: SLIGHT
WBC # BLD AUTO: 4 10E9/L (ref 4–11)

## 2021-04-11 PROCEDURE — 250N000013 HC RX MED GY IP 250 OP 250 PS 637: Performed by: PSYCHIATRY & NEUROLOGY

## 2021-04-11 PROCEDURE — 81025 URINE PREGNANCY TEST: CPT | Performed by: INTERNAL MEDICINE

## 2021-04-11 PROCEDURE — HZ2ZZZZ DETOXIFICATION SERVICES FOR SUBSTANCE ABUSE TREATMENT: ICD-10-PCS | Performed by: PSYCHIATRY & NEUROLOGY

## 2021-04-11 PROCEDURE — 87635 SARS-COV-2 COVID-19 AMP PRB: CPT | Performed by: INTERNAL MEDICINE

## 2021-04-11 PROCEDURE — 82075 ASSAY OF BREATH ETHANOL: CPT | Performed by: INTERNAL MEDICINE

## 2021-04-11 PROCEDURE — 250N000013 HC RX MED GY IP 250 OP 250 PS 637: Performed by: INTERNAL MEDICINE

## 2021-04-11 PROCEDURE — 99284 EMERGENCY DEPT VISIT MOD MDM: CPT | Performed by: INTERNAL MEDICINE

## 2021-04-11 PROCEDURE — 128N000004 HC R&B CD ADULT

## 2021-04-11 PROCEDURE — 85025 COMPLETE CBC W/AUTO DIFF WBC: CPT | Performed by: INTERNAL MEDICINE

## 2021-04-11 PROCEDURE — C9803 HOPD COVID-19 SPEC COLLECT: HCPCS | Performed by: INTERNAL MEDICINE

## 2021-04-11 PROCEDURE — 80307 DRUG TEST PRSMV CHEM ANLYZR: CPT | Performed by: INTERNAL MEDICINE

## 2021-04-11 PROCEDURE — 80053 COMPREHEN METABOLIC PANEL: CPT | Performed by: INTERNAL MEDICINE

## 2021-04-11 PROCEDURE — 99285 EMERGENCY DEPT VISIT HI MDM: CPT | Mod: 25 | Performed by: INTERNAL MEDICINE

## 2021-04-11 PROCEDURE — 80320 DRUG SCREEN QUANTALCOHOLS: CPT | Performed by: INTERNAL MEDICINE

## 2021-04-11 RX ORDER — DIAZEPAM 5 MG
5-20 TABLET ORAL EVERY 30 MIN PRN
Status: DISCONTINUED | OUTPATIENT
Start: 2021-04-11 | End: 2021-04-13 | Stop reason: HOSPADM

## 2021-04-11 RX ORDER — MULTIPLE VITAMINS W/ MINERALS TAB 9MG-400MCG
1 TAB ORAL DAILY
Status: DISCONTINUED | OUTPATIENT
Start: 2021-04-12 | End: 2021-04-13 | Stop reason: HOSPADM

## 2021-04-11 RX ORDER — LOPERAMIDE HCL 2 MG
2 CAPSULE ORAL 4 TIMES DAILY PRN
Status: DISCONTINUED | OUTPATIENT
Start: 2021-04-11 | End: 2021-04-13 | Stop reason: HOSPADM

## 2021-04-11 RX ORDER — ACETAMINOPHEN 500 MG
500-1000 TABLET ORAL EVERY 6 HOURS PRN
Status: DISCONTINUED | OUTPATIENT
Start: 2021-04-11 | End: 2021-04-13 | Stop reason: HOSPADM

## 2021-04-11 RX ORDER — FOLIC ACID 1 MG/1
1 TABLET ORAL DAILY
Status: DISCONTINUED | OUTPATIENT
Start: 2021-04-11 | End: 2021-04-11

## 2021-04-11 RX ORDER — SERTRALINE HYDROCHLORIDE 25 MG/1
25 TABLET, FILM COATED ORAL DAILY
Status: DISCONTINUED | OUTPATIENT
Start: 2021-04-11 | End: 2021-04-12

## 2021-04-11 RX ORDER — ACETAMINOPHEN 500 MG
500-1000 TABLET ORAL EVERY 6 HOURS PRN
Status: ON HOLD | COMMUNITY
End: 2021-04-13

## 2021-04-11 RX ORDER — ATENOLOL 50 MG/1
50 TABLET ORAL DAILY PRN
Status: DISCONTINUED | OUTPATIENT
Start: 2021-04-11 | End: 2021-04-13 | Stop reason: HOSPADM

## 2021-04-11 RX ORDER — POTASSIUM CHLORIDE 750 MG/1
40 TABLET, EXTENDED RELEASE ORAL ONCE
Status: DISCONTINUED | OUTPATIENT
Start: 2021-04-11 | End: 2021-04-11

## 2021-04-11 RX ORDER — PRENATAL VIT/IRON FUM/FOLIC AC 27MG-0.8MG
1 TABLET ORAL DAILY
Status: ON HOLD | COMMUNITY
End: 2022-12-27

## 2021-04-11 RX ORDER — LORAZEPAM 1 MG/1
1-4 TABLET ORAL EVERY 30 MIN PRN
Status: DISCONTINUED | OUTPATIENT
Start: 2021-04-11 | End: 2021-04-11

## 2021-04-11 RX ORDER — MULTIPLE VITAMINS W/ MINERALS TAB 9MG-400MCG
1 TAB ORAL DAILY
Status: DISCONTINUED | OUTPATIENT
Start: 2021-04-11 | End: 2021-04-11

## 2021-04-11 RX ORDER — NORETHINDRONE ACETATE AND ETHINYL ESTRADIOL 1MG-20(21)
1 KIT ORAL DAILY
Status: ON HOLD | COMMUNITY
End: 2021-04-13

## 2021-04-11 RX ORDER — LANOLIN ALCOHOL/MO/W.PET/CERES
100 CREAM (GRAM) TOPICAL DAILY
Status: DISCONTINUED | OUTPATIENT
Start: 2021-04-11 | End: 2021-04-11

## 2021-04-11 RX ORDER — TRAZODONE HYDROCHLORIDE 50 MG/1
50 TABLET, FILM COATED ORAL
Status: DISCONTINUED | OUTPATIENT
Start: 2021-04-11 | End: 2021-04-13 | Stop reason: HOSPADM

## 2021-04-11 RX ORDER — PRENATAL VIT/IRON FUM/FOLIC AC 27MG-0.8MG
1 TABLET ORAL DAILY
Status: DISCONTINUED | OUTPATIENT
Start: 2021-04-11 | End: 2021-04-13 | Stop reason: HOSPADM

## 2021-04-11 RX ORDER — LANOLIN ALCOHOL/MO/W.PET/CERES
100 CREAM (GRAM) TOPICAL DAILY
Status: DISCONTINUED | OUTPATIENT
Start: 2021-04-12 | End: 2021-04-13 | Stop reason: HOSPADM

## 2021-04-11 RX ORDER — ONDANSETRON 4 MG/1
4 TABLET, ORALLY DISINTEGRATING ORAL EVERY 6 HOURS PRN
Status: DISCONTINUED | OUTPATIENT
Start: 2021-04-11 | End: 2021-04-13 | Stop reason: HOSPADM

## 2021-04-11 RX ORDER — MAGNESIUM HYDROXIDE/ALUMINUM HYDROXICE/SIMETHICONE 120; 1200; 1200 MG/30ML; MG/30ML; MG/30ML
30 SUSPENSION ORAL EVERY 4 HOURS PRN
Status: DISCONTINUED | OUTPATIENT
Start: 2021-04-11 | End: 2021-04-13 | Stop reason: HOSPADM

## 2021-04-11 RX ORDER — NORETHINDRONE ACETATE AND ETHINYL ESTRADIOL 1MG-20(21)
1 KIT ORAL DAILY
Status: DISCONTINUED | OUTPATIENT
Start: 2021-04-11 | End: 2021-04-11 | Stop reason: CLARIF

## 2021-04-11 RX ORDER — FOLIC ACID 1 MG/1
1 TABLET ORAL DAILY
Status: DISCONTINUED | OUTPATIENT
Start: 2021-04-12 | End: 2021-04-13 | Stop reason: HOSPADM

## 2021-04-11 RX ORDER — AMOXICILLIN 250 MG
1 CAPSULE ORAL 2 TIMES DAILY PRN
Status: DISCONTINUED | OUTPATIENT
Start: 2021-04-11 | End: 2021-04-13 | Stop reason: HOSPADM

## 2021-04-11 RX ORDER — HYDROXYZINE HYDROCHLORIDE 25 MG/1
25 TABLET, FILM COATED ORAL EVERY 4 HOURS PRN
Status: DISCONTINUED | OUTPATIENT
Start: 2021-04-11 | End: 2021-04-13 | Stop reason: HOSPADM

## 2021-04-11 RX ORDER — POTASSIUM CHLORIDE 750 MG/1
40 TABLET, EXTENDED RELEASE ORAL ONCE
Status: COMPLETED | OUTPATIENT
Start: 2021-04-11 | End: 2021-04-11

## 2021-04-11 RX ADMIN — MULTIPLE VITAMINS W/ MINERALS TAB 1 TABLET: TAB at 13:11

## 2021-04-11 RX ADMIN — DIAZEPAM 10 MG: 5 TABLET ORAL at 17:01

## 2021-04-11 RX ADMIN — TRAZODONE HYDROCHLORIDE 50 MG: 50 TABLET ORAL at 20:39

## 2021-04-11 RX ADMIN — NICOTINE POLACRILEX 4 MG: 4 LOZENGE ORAL at 20:33

## 2021-04-11 RX ADMIN — POTASSIUM CHLORIDE 40 MEQ: 750 TABLET, EXTENDED RELEASE ORAL at 14:32

## 2021-04-11 RX ADMIN — DIAZEPAM 10 MG: 5 TABLET ORAL at 20:07

## 2021-04-11 RX ADMIN — Medication 100 MG: at 13:11

## 2021-04-11 RX ADMIN — FOLIC ACID 1 MG: 1 TABLET ORAL at 13:11

## 2021-04-11 RX ADMIN — LORAZEPAM 1 MG: 1 TABLET ORAL at 13:11

## 2021-04-11 RX ADMIN — LORAZEPAM 2 MG: 1 TABLET ORAL at 14:32

## 2021-04-11 ASSESSMENT — ACTIVITIES OF DAILY LIVING (ADL)
FALL_HISTORY_WITHIN_LAST_SIX_MONTHS: YES
DRESSING/BATHING_DIFFICULTY: NO
WEAR_GLASSES_OR_BLIND: YES
DOING_ERRANDS_INDEPENDENTLY_DIFFICULTY: NO
NUMBER_OF_TIMES_PATIENT_HAS_FALLEN_WITHIN_LAST_SIX_MONTHS: 1
WALKING_OR_CLIMBING_STAIRS_DIFFICULTY: NO
HEARING_DIFFICULTY_OR_DEAF: NO
VISION_MANAGEMENT: GLASSES
DIFFICULTY_EATING/SWALLOWING: NO
TOILETING_ISSUES: NO
PATIENT_/_FAMILY_COMMUNICATION_STYLE: SPOKEN LANGUAGE (ENGLISH OR BILINGUAL)
CONCENTRATING,_REMEMBERING_OR_MAKING_DECISIONS_DIFFICULTY: NO
DIFFICULTY_COMMUNICATING: NO

## 2021-04-11 ASSESSMENT — ENCOUNTER SYMPTOMS
ARTHRALGIAS: 0
ABDOMINAL PAIN: 0
TREMORS: 1
FEVER: 0
SEIZURES: 0
DIFFICULTY URINATING: 0
CONFUSION: 0
HEADACHES: 0
COLOR CHANGE: 0
EYE REDNESS: 0
NECK STIFFNESS: 0
SHORTNESS OF BREATH: 0

## 2021-04-11 ASSESSMENT — MIFFLIN-ST. JEOR: SCORE: 1342.25

## 2021-04-11 NOTE — TELEPHONE ENCOUNTER
S:   from [place] ED called at 2:33pm with 35y/F seeking detox    B:  Pt presents with breath alcohol of .072.  Pt reports she has been drinking 1/2 - 1 liter of vodka daily for 3 months.  Pt denies seizures, but reports shakes and nausea.  Pt UTOX is neg for other substance use.       A:  Vol  COVID Resulted NEGATIVE  Pregnacy is Negative    R:  Patient cleared and ready for behavioral bed placement: Yes   Provider paged at 2:35pm for [facility]  [facility] accepted, but request ED replaced potasium, as currently potassium is 2.9 and  The normal limits are 3.4 to 5.3.   ED Called and Dr in ED reported already replacing potassium @ 2:41pm    Pt put in que and unit charge called with disposition at  2:43pm    ED updated with placement at 2:44pm.

## 2021-04-11 NOTE — ED NOTES
"ED to Behavioral Floor Handoff    SITUATION  Frida Chaudhary is a 35 year old female who speaks English and lives in a home with minor children only  The patient arrived in the ED by private car from home with a complaint of Alcohol Problem (Detox from alcohol, last drink last night, \" I drink sabrina everyday and I need help\")  .    The patient has had symptoms for 3 month(s) and during this time the symptoms have increased.     Initial vitals were: BP: (!) 141/99  Pulse: 100  Temp: 98.4  F (36.9  C)  Resp: 16  Weight: 61.2 kg (135 lb)  SpO2: 100 %   Is the patient diabetic? No   If yes, last blood glucose? --  If yes, was this treated in the ED? --  Does the patient have a seizure history? No   If yes, date of most recent seizure--  Is the patient inebriated (ETOH) or intoxicated (other substance)? No  MSSA done? Yes  Last MSSA score: --13  Were withdrawal symptoms treated? Yes  Is the patient patient experiencing suicidal ideation? denies current or recent suicidal ideation     Homicidal ideation? denies current or recent homicidal ideation or behaviors.    Self-injurious behavior/urges? denies current or recent self injurious behavior or ideation.  Was pt aggressive in the ED No  Was a code called No  Is the pt now cooperative? Yes  Meds given in ED:   Medications   LORazepam (ATIVAN) tablet 1-4 mg (2 mg Oral Given 4/11/21 1432)   thiamine (B-1) tablet 100 mg (100 mg Oral Given 4/11/21 1311)   folic acid (FOLVITE) tablet 1 mg (1 mg Oral Given 4/11/21 1311)   multivitamin w/minerals (THERA-VIT-M) tablet 1 tablet (1 tablet Oral Given 4/11/21 1311)   potassium chloride ER (KLOR-CON M) CR tablet 40 mEq (40 mEq Oral Given 4/11/21 1432)      Family present during ED course? No  Family currently present? No    BACKGROUND  In the ED, pt was diagnosed with   Final diagnoses:   Alcohol abuse   Alcohol dependence with withdrawal with complication (H)      Does the patient have a cognitive impairment or developmental " disability? No  Patient's home meds are:  Allergies: No Known Allergies.   Social demographics are   Social History     Socioeconomic History     Marital status:      Spouse name: None     Number of children: None     Years of education: None     Highest education level: None   Occupational History     None   Social Needs     Financial resource strain: None     Food insecurity     Worry: None     Inability: None     Transportation needs     Medical: None     Non-medical: None   Tobacco Use     Smoking status: Former Smoker     Smokeless tobacco: Former User   Substance and Sexual Activity     Alcohol use: Yes     Drug use: Not Currently     Sexual activity: Yes     Partners: Female   Lifestyle     Physical activity     Days per week: None     Minutes per session: None     Stress: None   Relationships     Social connections     Talks on phone: None     Gets together: None     Attends Restorationism service: None     Active member of club or organization: None     Attends meetings of clubs or organizations: None     Relationship status: None     Intimate partner violence     Fear of current or ex partner: None     Emotionally abused: None     Physically abused: None     Forced sexual activity: None   Other Topics Concern     None   Social History Narrative     None        ASSESSMENT  Labs results   Labs Ordered and Resulted from Time of ED Arrival Up to the Time of Departure from the ED   CBC WITH PLATELETS DIFFERENTIAL - Abnormal; Notable for the following components:       Result Value    RBC Count 3.53 (*)     Hematocrit 34.0 (*)     MCH 33.7 (*)     Absolute Lymphocytes 0.6 (*)     All other components within normal limits   COMPREHENSIVE METABOLIC PANEL - Abnormal; Notable for the following components:    Potassium 2.9 (*)     Glucose 102 (*)     Urea Nitrogen 6 (*)     Calcium 8.4 (*)     AST 61 (*)     All other components within normal limits   DRUG ABUSE SCREEN 6 CHEM DEP URINE ([facility]) - Abnormal; Notable  for the following components:    Ethanol Qual Urine Positive (*)     All other components within normal limits   ALCOHOL BREATH TEST POCT - Abnormal; Notable for the following components:    Alcohol Breath Test 0.072 (*)     All other components within normal limits   HCG QUALITATIVE URINE   SARS-COV-2 (COVID-19) VIRUS RT-PCR   MSSA SCORE AND VS   NOTIFY      Imaging Studies: No results found for this or any previous visit (from the past 24 hour(s)).   Most recent vital signs /88   Pulse 74   Temp 98.2  F (36.8  C)   Resp 16   Wt 61.2 kg (135 lb)   LMP 03/14/2021 (Approximate)   SpO2 98%   Breastfeeding No    Abnormal labs/tests/findings requiring intervention:---   Pain control: pt had none  Nausea control: good    RECOMMENDATION  Are any infection precautions needed (MRSA, VRE, etc.)? No   If yes, what infection? --  Does the patient mobility issues? independently.  If yes what device does the pt use? ---  Is patient on 72 hour hold or commitment? No  If on 72 hour hold, have hold and rights been given to patient? N/A  Are admitting orders written if after 10 p.m. ?N/A  Tasks needing to be completed:---     Cristino العراقي, RN   [facility]--    [phone] [place] [facility]   [phone] [facility] ED

## 2021-04-11 NOTE — ED NOTES
Rn educated pt on detox unit, including that it is a locked unit, no cell phones allowed as well as no smoking
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
## 2021-04-11 NOTE — PHARMACY-ADMISSION MEDICATION HISTORY
Admission Medication History Completed by Pharmacy    See [facility] Admission Navigator for allergy information, preferred outpatient pharmacy, prior to admission medications and immunization status.     Medication History Sources:     Patient    Surescripts    Changes made to PTA medication list (reason):    Added: omeprazole, Aurovela FE 1/20, prenatal vitamin, Tylenol    Deleted: None    Changed: None    Additional Information:    Patient has been taking Sertraline 25 mg per her MD for about 9 months now even though Surescripts shows 50 mg daily.    Pt occasionally takes an allergy medication but is unsure which she takes. Not added to the PTA list.    Patient is on her placebo week for her birth control currently and did not bring a supply with her into clinic.    Prior to Admission medications    Medication Sig Last Dose Taking? Auth Provider   acetaminophen (TYLENOL) 500 MG tablet Take 500-1,000 mg by mouth every 6 hours as needed for mild pain PRN Yes Reported, Patient   norethindrone-ethinyl estradiol (AUROVELA FE 1/20) 1-20 MG-MCG tablet Take 1 tablet by mouth daily 4/10/2021 at Unknown time Yes Reported, Patient   omeprazole (PRILOSEC) 20 MG DR capsule Take 20 mg by mouth daily 4/11/2021 at Unknown time Yes Reported, Patient   Prenatal Vit-Fe Fumarate-FA (PRENATAL MULTIVITAMIN W/IRON) 27-0.8 MG tablet Take 1 tablet by mouth daily Past Week at Unknown time Yes Reported, Patient   sertraline (ZOLOFT) 50 MG tablet Take 25 mg by mouth daily 4/11/2021 at Unknown time Yes Reported, Patient       Date completed: 04/11/21    Medication history completed by: Natty Kaur

## 2021-04-11 NOTE — PROGRESS NOTES
04/11/21 7419   Patient Belongings   Did you bring any home meds/supplements to the hospital?  Yes   Disposition of meds  Other (see comment);Sent to security/pharmacy per site process   Patient Belongings other (see comments);returned to patient at discharge;locker   Patient Belongings Remaining with Patient other (see comments)   Patient Belongings Put in Hospital Secure Location (Security or Locker, etc.) other (see comments);walker   Belongings Search Yes   Clothing Search Yes   Second Staff SKYE LE RN,   Comment see my note   Storage Bin   Jacket, shoes w/laces, backpack, laptop/  Medical room bin  Wallet, cellphone,  sunglasses  A             Admission:  I am responsible for any personal items that are not sent to the safe or pharmacy.  [facility] is not responsible for loss, theft or damage of any property in my possession.  Signature:  _________________________________ Date: _______  Time: _____                                              Staff Signature:  ____________________________ Date: ________  Time: _____      2nd Staff person, if patient is unable/unwilling to sign:    Signature: ________________________________ Date: ________  Time: _____   Discharge:  [facility] has returned all of my personal belongings:  Signature: _________________________________ Date: ________  Time: _____                                          Staff Signature:  ____________________________ Date: ________  Time: _____

## 2021-04-11 NOTE — ED PROVIDER NOTES
"ED Provider Note  [facility]      History     Chief Complaint   Patient presents with     Alcohol Problem     Detox from alcohol, last drink last night, \" I drink sabrina everyday and I need help\"     The history is provided by the patient and medical records.     Frida Chaudhary is a 35 year old female with a past medical history significant for anxiety and depression who presents to the ED for evaluation of an alcohol problem.  The patient reports that she has been drinking about a half of a liter for the past few months.  The patient will usually drink all day long.  Patient's last drink was last night.  The patient has not had detox a year ago and was sober for about 3.5 months but then relapsed.  The patient is currently experiencing shakes but has never had any withdrawal seizures.  The patient presents to the ED today seeking detox from alcohol.  She denies any suicidal ideation.  The patient denies using any drugs or tobacco.  The patient has not had her COVID-19 vaccination.    Past Medical History  Past Medical History:   Diagnosis Date     Anxiety      Depressive disorder      Past Surgical History:   Procedure Laterality Date     ORTHOPEDIC SURGERY       acetaminophen (TYLENOL) 500 MG tablet  norethindrone-ethinyl estradiol (AUROVELA FE 1/20) 1-20 MG-MCG tablet  omeprazole (PRILOSEC) 20 MG DR capsule  Prenatal Vit-Fe Fumarate-FA (PRENATAL MULTIVITAMIN W/IRON) 27-0.8 MG tablet  sertraline (ZOLOFT) 50 MG tablet      No Known Allergies  Family History  History reviewed. No pertinent family history.  Social History   Social History     Tobacco Use     Smoking status: Former Smoker     Smokeless tobacco: Former User   Substance Use Topics     Alcohol use: Yes     Drug use: Not Currently      Past medical history, past surgical history, medications, allergies, family history, and social history were reviewed with the patient. No additional pertinent items.       Review of Systems " Conjuntivae and eyelids appear normal, Sclerae : White without injection   Constitutional: Negative for fever.   HENT: Negative for congestion.    Eyes: Negative for redness.   Respiratory: Negative for shortness of breath.    Cardiovascular: Negative for chest pain.   Gastrointestinal: Negative for abdominal pain.   Genitourinary: Negative for difficulty urinating.   Musculoskeletal: Negative for arthralgias and neck stiffness.   Skin: Negative for color change.   Neurological: Positive for tremors. Negative for seizures and headaches.   Psychiatric/Behavioral: Negative for confusion and suicidal ideas.     A complete review of systems was performed with pertinent positives and negatives noted in the HPI, and all other systems negative.    Physical Exam   BP: (!) 141/99  Pulse: 100  Temp: 98.4  F (36.9  C)  Resp: 16  Weight: 61.2 kg (135 lb)  SpO2: 100 %  Physical Exam  Constitutional:       General: She is not in acute distress.     Appearance: She is not diaphoretic.   HENT:      Head: Atraumatic.      Mouth/Throat:      Mouth: Mucous membranes are dry.      Pharynx: No oropharyngeal exudate.   Eyes:      General: No scleral icterus.     Pupils: Pupils are equal, round, and reactive to light.   Neck:      Musculoskeletal: Neck supple.   Cardiovascular:      Rate and Rhythm: Normal rate and regular rhythm.      Heart sounds: Normal heart sounds. No murmur. No friction rub. No gallop.    Pulmonary:      Effort: Pulmonary effort is normal. No respiratory distress.      Breath sounds: Normal breath sounds. No stridor. No wheezing, rhonchi or rales.   Chest:      Chest wall: No tenderness.   Abdominal:      General: Abdomen is flat. Bowel sounds are normal. There is no distension.      Palpations: Abdomen is soft. There is no mass.      Tenderness: There is no abdominal tenderness. There is no right CVA tenderness, left CVA tenderness, guarding or rebound.      Hernia: No hernia is present.   Musculoskeletal:         General: No tenderness.   Skin:     General: Skin is warm.      Findings:  No rash.   Neurological:      General: No focal deficit present.      Cranial Nerves: No cranial nerve deficit.      Comments: shaky   Psychiatric:         Mood and Affect: Mood normal.         Behavior: Behavior normal.         Thought Content: Thought content normal.         Judgment: Judgment normal.         ED Course       12:10 PM  The patient was seen and examined by Rocky Weiner MD in Room ED08.   Procedures               Results for orders placed or performed during the hospital encounter of 04/11/21   CBC with platelets differential     Status: Abnormal   Result Value Ref Range    WBC 4.0 4.0 - 11.0 10e9/L    RBC Count 3.53 (L) 3.8 - 5.2 10e12/L    Hemoglobin 11.9 11.7 - 15.7 g/dL    Hematocrit 34.0 (L) 35.0 - 47.0 %    MCV 96 78 - 100 fl    MCH 33.7 (H) 26.5 - 33.0 pg    MCHC 35.0 31.5 - 36.5 g/dL    RDW 13.8 10.0 - 15.0 %    Platelet Count 219 150 - 450 10e9/L    Diff Method Manual Differential     % Neutrophils 80.8 %    % Lymphocytes 15.7 %    % Monocytes 0.9 %    % Eosinophils 0.0 %    % Basophils 2.6 %    Absolute Neutrophil 3.2 1.6 - 8.3 10e9/L    Absolute Lymphocytes 0.6 (L) 0.8 - 5.3 10e9/L    Absolute Monocytes 0.0 0.0 - 1.3 10e9/L    Absolute Eosinophils 0.0 0.0 - 0.7 10e9/L    Absolute Basophils 0.1 0.0 - 0.2 10e9/L    Poikilocytosis Slight     Stomatocytes Slight     Platelet Estimate Confirming automated cell count    Comprehensive metabolic panel     Status: Abnormal   Result Value Ref Range    Sodium 139 133 - 144 mmol/L    Potassium 2.9 (L) 3.4 - 5.3 mmol/L    Chloride 101 94 - 109 mmol/L    Carbon Dioxide 26 20 - 32 mmol/L    Anion Gap 12 3 - 14 mmol/L    Glucose 102 (H) 70 - 99 mg/dL    Urea Nitrogen 6 (L) 7 - 30 mg/dL    Creatinine 0.57 0.52 - 1.04 mg/dL    GFR Estimate >90 >60 mL/min/[1.73_m2]    GFR Estimate If Black >90 >60 mL/min/[1.73_m2]    Calcium 8.4 (L) 8.5 - 10.1 mg/dL    Bilirubin Total 0.6 0.2 - 1.3 mg/dL    Albumin 3.7 3.4 - 5.0 g/dL    Protein Total 7.3 6.8 - 8.8 g/dL     Alkaline Phosphatase 49 40 - 150 U/L    ALT 32 0 - 50 U/L    AST 61 (H) 0 - 45 U/L   Drug abuse screen 6 urine (chem dep)     Status: Abnormal   Result Value Ref Range    Amphetamine Qual Urine Negative NEG^Negative    Barbiturates Qual Urine Negative NEG^Negative    Benzodiazepine Qual Urine Negative NEG^Negative    Cannabinoids Qual Urine Negative NEG^Negative    Cocaine Qual Urine Negative NEG^Negative    Ethanol Qual Urine Positive (A) NEG^Negative    Opiates Qualitative Urine Negative NEG^Negative   HCG qualitative urine     Status: None   Result Value Ref Range    HCG Qual Urine Negative NEG^Negative   Asymptomatic SARS-CoV-2 COVID-19 Virus (Coronavirus) by PCR     Status: None    Specimen: Nasopharyngeal   Result Value Ref Range    SARS-CoV-2 Virus Specimen Source Nasopharyngeal     SARS-CoV-2 PCR Result NEGATIVE     SARS-CoV-2 PCR Comment (Note)    Alcohol breath test POCT     Status: Abnormal   Result Value Ref Range    Alcohol Breath Test 0.072 (A) 0.00 - 0.01     Medications   LORazepam (ATIVAN) tablet 1-4 mg (2 mg Oral Given 4/11/21 1432)   thiamine (B-1) tablet 100 mg (100 mg Oral Given 4/11/21 1311)   folic acid (FOLVITE) tablet 1 mg (1 mg Oral Given 4/11/21 1311)   multivitamin w/minerals (THERA-VIT-M) tablet 1 tablet (1 tablet Oral Given 4/11/21 1311)   potassium chloride ER (KLOR-CON M) CR tablet 40 mEq (40 mEq Oral Given 4/11/21 1432)        Assessments & Plan (with Medical Decision Making)  Alcohol abuse and dependence with approximately 3 mo plus h/o vodka every day up to 1/2 bottle a day, last drink last night, breathylyzer 0.072 and very shaky, h/o DT but no Sz, medically clear and no mental health concerns, COVID neg, Labs ok except slightly low K-repleting, D/W MH intake-admit to Detox. MSSA with ativan started.       I have reviewed the nursing notes. I have reviewed the findings, diagnosis, plan and need for follow up with the patient.    New Prescriptions    No medications on file        Final diagnoses:   Alcohol abuse   Alcohol dependence with withdrawal with complication (H)       --  IDylan, am serving as a trained medical scribe to document services personally performed by Rocky Weiner MD, based on the provider's statements to me.     Rocky SOARES MD, was physically present and have reviewed and verified the accuracy of this note documented by Dylan Tucker.    Rocky Weiner MD  [facility] EMERGENCY DEPARTMENT  4/11/2021     Rocky Weiner MD  04/11/21 1534

## 2021-04-12 ENCOUNTER — APPOINTMENT (OUTPATIENT)
Dept: CT IMAGING | Facility: CLINIC | Age: 36
End: 2021-04-12
Attending: PHYSICIAN ASSISTANT
Payer: MEDICAID

## 2021-04-12 LAB
CHOLEST SERPL-MCNC: 273 MG/DL
GGT SERPL-CCNC: 571 U/L (ref 0–40)
HDLC SERPL-MCNC: 137 MG/DL
LDLC SERPL CALC-MCNC: 122 MG/DL
NONHDLC SERPL-MCNC: 136 MG/DL
POTASSIUM SERPL-SCNC: 3.4 MMOL/L (ref 3.4–5.3)
TRIGL SERPL-MCNC: 72 MG/DL
TSH SERPL DL<=0.005 MIU/L-ACNC: 3.99 MU/L (ref 0.4–4)
VIT B12 SERPL-MCNC: 695 PG/ML (ref 193–986)

## 2021-04-12 PROCEDURE — 80061 LIPID PANEL: CPT | Performed by: PSYCHIATRY & NEUROLOGY

## 2021-04-12 PROCEDURE — 250N000013 HC RX MED GY IP 250 OP 250 PS 637: Performed by: PSYCHIATRY & NEUROLOGY

## 2021-04-12 PROCEDURE — 36415 COLL VENOUS BLD VENIPUNCTURE: CPT | Performed by: PSYCHIATRY & NEUROLOGY

## 2021-04-12 PROCEDURE — 70450 CT HEAD/BRAIN W/O DYE: CPT | Mod: 26 | Performed by: RADIOLOGY

## 2021-04-12 PROCEDURE — 70450 CT HEAD/BRAIN W/O DYE: CPT

## 2021-04-12 PROCEDURE — 84132 ASSAY OF SERUM POTASSIUM: CPT | Performed by: PSYCHIATRY & NEUROLOGY

## 2021-04-12 PROCEDURE — 99207 PR CONSULT E&M CHANGED TO INITIAL LEVEL: CPT | Performed by: PHYSICIAN ASSISTANT

## 2021-04-12 PROCEDURE — 84443 ASSAY THYROID STIM HORMONE: CPT | Performed by: PSYCHIATRY & NEUROLOGY

## 2021-04-12 PROCEDURE — 82607 VITAMIN B-12: CPT | Performed by: PSYCHIATRY & NEUROLOGY

## 2021-04-12 PROCEDURE — 82977 ASSAY OF GGT: CPT | Performed by: PSYCHIATRY & NEUROLOGY

## 2021-04-12 PROCEDURE — 128N000004 HC R&B CD ADULT

## 2021-04-12 PROCEDURE — 99222 1ST HOSP IP/OBS MODERATE 55: CPT | Performed by: PHYSICIAN ASSISTANT

## 2021-04-12 PROCEDURE — 99223 1ST HOSP IP/OBS HIGH 75: CPT | Mod: AI | Performed by: PSYCHIATRY & NEUROLOGY

## 2021-04-12 RX ORDER — FLUOXETINE 10 MG/1
10 CAPSULE ORAL DAILY
Status: DISCONTINUED | OUTPATIENT
Start: 2021-04-12 | End: 2021-04-13 | Stop reason: HOSPADM

## 2021-04-12 RX ORDER — SERTRALINE HCL 25 MG
12.5 TABLET ORAL DAILY
Status: DISCONTINUED | OUTPATIENT
Start: 2021-04-13 | End: 2021-04-13 | Stop reason: HOSPADM

## 2021-04-12 RX ADMIN — SERTRALINE HYDROCHLORIDE 25 MG: 25 TABLET ORAL at 07:56

## 2021-04-12 RX ADMIN — DIAZEPAM 10 MG: 5 TABLET ORAL at 09:24

## 2021-04-12 RX ADMIN — TRAZODONE HYDROCHLORIDE 50 MG: 50 TABLET ORAL at 20:15

## 2021-04-12 RX ADMIN — DIAZEPAM 10 MG: 5 TABLET ORAL at 00:52

## 2021-04-12 RX ADMIN — Medication 100 MG: at 07:56

## 2021-04-12 RX ADMIN — FOLIC ACID 1 MG: 1 TABLET ORAL at 07:56

## 2021-04-12 RX ADMIN — FLUOXETINE 10 MG: 10 CAPSULE ORAL at 12:32

## 2021-04-12 RX ADMIN — ATENOLOL 50 MG: 50 TABLET ORAL at 05:20

## 2021-04-12 RX ADMIN — NICOTINE POLACRILEX 4 MG: 4 LOZENGE ORAL at 11:37

## 2021-04-12 RX ADMIN — PRENATAL VITAMINS-IRON FUMARATE 27 MG IRON-FOLIC ACID 0.8 MG TABLET 1 TABLET: at 07:56

## 2021-04-12 RX ADMIN — OMEPRAZOLE 20 MG: 20 CAPSULE, DELAYED RELEASE ORAL at 07:56

## 2021-04-12 RX ADMIN — DIAZEPAM 5 MG: 5 TABLET ORAL at 05:12

## 2021-04-12 RX ADMIN — NICOTINE POLACRILEX 4 MG: 4 LOZENGE ORAL at 13:13

## 2021-04-12 ASSESSMENT — ACTIVITIES OF DAILY LIVING (ADL)
ORAL_HYGIENE: INDEPENDENT
DRESS: INDEPENDENT
HYGIENE/GROOMING: INDEPENDENT
LAUNDRY: WITH SUPERVISION
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
## 2021-04-12 NOTE — PLAN OF CARE
Behavioral Team Discussion: (4/12/2021)    Continued Stay Criteria/Rationale: Patient admitted for alcohol withdrawal, complicated.  Plan: The following services will be provided to the patient; psychiatric assessment, medication management, therapeutic milieu, individual and group support, and skills groups.   Participants: 3A Provider: Dr. Griffin Rai MD; 3A RN's: Aishwarya Chatman, RN; 3A CM's: Rosamaria Reynaga [facility]  Summary/Recommendation: Providers will assess today for treatment recommendations, discharge planning, and aftercare plans. CM will meet with pt for discharge planning.   Medical/Physical: Internal medicine consult to be completed 4/12/2021 .  Precautions:   Behavioral Orders   Procedures     Code 1 - Restrict to Unit     Routine Programming     As clinically indicated     Status 15     Every 15 minutes.     Withdrawal precautions     Rationale for change in precautions or plan: N/A  Progress: No Change.

## 2021-04-12 NOTE — PROGRESS NOTES
Case Management Note  4/12/2021    Met with pt to discuss discharge planning. Pt reports a goal to attend a virtual outpatient treatment. 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 Pt would likely need day OP and possibly childcare or a child-friendly program. Pt reports she is recently  and lives with her parents who both work. Pt reports her ex- is supportive but has a demanding job. Pt encouraged to complete intake paperwork for assessment.     Rosamaria Reynaga, LPCC, LADC
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
## 2021-04-12 NOTE — PROGRESS NOTES
"CLINICAL NUTRITION SERVICES - ASSESSMENT NOTE     Nutrition Prescription    RECOMMENDATIONS FOR MDs/PROVIDERS TO ORDER:  Strongly recommend ed treatment at discharge (pt has assessment appointment with krishna 5/10)   Document % intake of meals and snacks in flowsheet     Malnutrition Status:    Non-severe malnutrition in the context of chronic illness     Recommendations already ordered by Registered Dietitian (RD):  Diet education  Blind weights     Future/Additional Recommendations:  Monitor intakes and wt  Consider supplements should intakes decline     REASON FOR ASSESSMENT  Frida Chaudhary is a 35 year old female assessed by the dietitian for MST score of 3: positive  for wt loss of 24-33 lbs and negative  for poor PO intake R/t decreased appetite and pt/family request.  PMH significant for anxiety, depression and alcohol abuse admitted for detox.   NUTRITION HISTORY  Pt reports a history of restricting. Stated it started in college during times of high stress. She received treatment from a RD and therapist in [place] for 4 months however she did not find it to be helpful and reported losing 10 pounds during this time. After treatment she eventually \"came into herself\" and was no longer restricting. She again started restricting over the past year d/t increased stress while going through a divorce. Stated her triggers are stress and wt gain, does not have any trigger foods. Was unable to name any helpful strategies learned while in treatment previously. Reports a UBW of 150-155#, most recently 6-9 months ago. Prior to admission she was eating a banana and left overs from her kids for breakfast and would have a dinner meal (burger or pizza or veggies and rice) but nothing between the two meals.     CURRENT NUTRITION ORDERS  Diet: Regular  Intake/Tolerance: Pt reports improved intakes since admission. Stated she ate 75% of her breakfast this morning and 100% of her lunch. Was having 2 packets of goldfish and " "apple juice as a snack during our visit. Stated she has been having hunger cues (growling stomach) and has not been feeling guilty about eating. She believes her drinking was a main cause of her restricting recently and she believes she will continue to eat well during treatment. She has an appointment for an assessment at [facility] scheduled for 5/10. Declined snacks at this time.    LABS  Labs reviewed   Ref. Range 4/11/2021 13:05 4/12/2021 06:32   Potassium Latest Ref Range: 3.4 - 5.3 mmol/L 2.9 (L) 3.4     MEDICATIONS  Medications reviewed:  Folic acid  thera-vit-m  prilosec  Prenatal vitamin w/ iron  thiamine  PRN: maalox, imodium, zofran, senokot    ANTHROPOMETRICS  Height: 172.7 cm (5' 8\")  Most Recent Weight: 59.9 kg (132 lb)    IBW: 63.6 kg  BMI: 20.07 kg/m^2, Normal BMI  Weight History: No documented wt history, reports wt of 150-155# 6-9 months ago. 18-23# (12-14%) wt loss in 6-9 months per pt report  Wt Readings from Last 10 Encounters:   04/11/21 59.9 kg (132 lb)     Dosing Weight: 59.9 kg (current)     ASSESSED NUTRITION NEEDS  Estimated Energy Needs: 3302-4268 kcals/day (25 - 30 kcals/kg)  Justification: Maintenance  Estimated Protein Needs: 48-60 grams protein/day (0.8 - 1 grams of pro/kg)  Justification: Maintenance  Estimated Fluid Needs: 4163-3597 mL/day (1 mL/kcal)   Justification: Maintenance or Per provider pending fluid status    PHYSICAL FINDINGS  See malnutrition section below.    MALNUTRITION  % Intake: < 75% for > 7 days (non-severe)   % Weight Loss: > 10% in 6 months (severe)-per pt report  Subcutaneous Fat Loss: None observed  Muscle Loss: None observed  Fluid Accumulation/Edema: None noted  Malnutrition Diagnosis: Non-severe malnutrition in the context of chronic illness     NUTRITION DIAGNOSIS  Inadequate oral intake related to stress, substance abuse and restricting as evidenced by pt reported intakes PTA and wt loss of 18-23# (12-14%)  in 6-9 months.  "     INTERVENTIONS  Implementation  Nutrition Education: Discussed role of dietitian, menu ordering and available snacks/supplements.  Encouraged a well balanced diet and adequate protein. Discussed avoiding trigger foods, should any come up. Discussed having small/frequent meals and snacks. Pt happy with snacks provided on the menu.   Strongly recommend ed treatment at discharge (pt has assessment appointment with krishna 5/10)     Goals  Patient to consume % of nutritionally adequate meal trays TID, or the equivalent with supplements/snacks.     Monitoring/Evaluation  Progress toward goals will be monitored and evaluated per protocol.    Sharon Chow MS, RD, LDN  Unit Pager [phone]

## 2021-04-12 NOTE — PROGRESS NOTES
Chewing tobacco was found under patients pillow on routine room check. Per provider patient will have a complex care plan to include no personal belongings, scrubs only.   Aishwarya Chatman RN
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
## 2021-04-12 NOTE — PLAN OF CARE
Problem: Alcohol Withdrawal  Goal: Alcohol Withdrawal Symptom Control  Outcome: Improving  Patient slept for about 6.75 hours and reported no concerns. Her MSSA scores were 12 and 9 and she received Valium 10 mg and 5 mg respectively; heart rate were 111 and 102. Patient given Atenolol 50 mg PRN and heart rate an hour later was 81. Patient appears to be in stable condition.

## 2021-04-13 VITALS
HEIGHT: 68 IN | BODY MASS INDEX: 20 KG/M2 | RESPIRATION RATE: 16 BRPM | DIASTOLIC BLOOD PRESSURE: 90 MMHG | OXYGEN SATURATION: 96 % | WEIGHT: 132 LBS | TEMPERATURE: 97.2 F | SYSTOLIC BLOOD PRESSURE: 121 MMHG | HEART RATE: 89 BPM

## 2021-04-13 PROCEDURE — 99239 HOSP IP/OBS DSCHRG MGMT >30: CPT | Performed by: PSYCHIATRY & NEUROLOGY

## 2021-04-13 PROCEDURE — 250N000013 HC RX MED GY IP 250 OP 250 PS 637: Performed by: PSYCHIATRY & NEUROLOGY

## 2021-04-13 RX ORDER — SERTRALINE HYDROCHLORIDE 25 MG/1
12.5 TABLET, FILM COATED ORAL DAILY
Status: ON HOLD | COMMUNITY
Start: 2021-04-14 | End: 2022-12-27

## 2021-04-13 RX ORDER — MULTIPLE VITAMINS W/ MINERALS TAB 9MG-400MCG
1 TAB ORAL DAILY
Qty: 30 TABLET | Refills: 0 | Status: ON HOLD | OUTPATIENT
Start: 2021-04-14 | End: 2022-12-27

## 2021-04-13 RX ORDER — FOLIC ACID 1 MG/1
1 TABLET ORAL DAILY
Qty: 30 TABLET | Refills: 0 | Status: ON HOLD | OUTPATIENT
Start: 2021-04-14 | End: 2022-12-27

## 2021-04-13 RX ORDER — FLUOXETINE 10 MG/1
10 CAPSULE ORAL DAILY
Qty: 60 CAPSULE | Refills: 0 | Status: ON HOLD | OUTPATIENT
Start: 2021-04-14 | End: 2022-12-27

## 2021-04-13 RX ORDER — LANOLIN ALCOHOL/MO/W.PET/CERES
100 CREAM (GRAM) TOPICAL DAILY
Qty: 30 TABLET | Refills: 0 | Status: ON HOLD | OUTPATIENT
Start: 2021-04-14 | End: 2022-12-27

## 2021-04-13 RX ORDER — TRAZODONE HYDROCHLORIDE 50 MG/1
50 TABLET, FILM COATED ORAL
Qty: 30 TABLET | Refills: 0 | Status: ON HOLD | OUTPATIENT
Start: 2021-04-13 | End: 2022-12-27

## 2021-04-13 RX ORDER — FLUOXETINE 10 MG/1
10 CAPSULE ORAL DAILY
Qty: 60 CAPSULE | Refills: 0 | Status: SHIPPED | OUTPATIENT
Start: 2021-04-14 | End: 2021-04-13

## 2021-04-13 RX ADMIN — MULTIPLE VITAMINS W/ MINERALS TAB 1 TABLET: TAB at 08:33

## 2021-04-13 RX ADMIN — FLUOXETINE 10 MG: 10 CAPSULE ORAL at 08:32

## 2021-04-13 RX ADMIN — FOLIC ACID 1 MG: 1 TABLET ORAL at 08:33

## 2021-04-13 RX ADMIN — Medication 100 MG: at 08:33

## 2021-04-13 RX ADMIN — PRENATAL VITAMINS-IRON FUMARATE 27 MG IRON-FOLIC ACID 0.8 MG TABLET 1 TABLET: at 08:33

## 2021-04-13 RX ADMIN — NICOTINE POLACRILEX 4 MG: 4 LOZENGE ORAL at 09:22

## 2021-04-13 RX ADMIN — Medication 12.5 MG: at 08:33

## 2021-04-13 RX ADMIN — OMEPRAZOLE 20 MG: 20 CAPSULE, DELAYED RELEASE ORAL at 08:33

## 2021-04-13 NOTE — PLAN OF CARE
Pt was out in the [facility] majority of shift. She attended groups. MSSA score was 5 and 4. She did not have any complaints of not feeling well. She did receive trazadone prn for sleep. SH denies any feelings of depression or anxiety,

## 2021-04-13 NOTE — PROGRESS NOTES
04/13/21 0606   Sleep/Rest/Relaxation   Sleep/Rest/Relaxation appears asleep   Night Time # Hours 6.5 hours     MSSA=3; patient endorsed mild sweats. We'll continue to monitor.

## 2021-04-13 NOTE — PROGRESS NOTES
Patient ex[periencved an uneventful discharge.  She verbalizes continuing absence of all suicidal ideation, and states that is discharging to a safe harbor while awaiting treatment.  She departed with all personal effects and discharge medications in hand.
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
## 2021-04-13 NOTE — DISCHARGE INSTRUCTIONS
Behavioral Discharge Planning and Instructions  THANK YOU FOR CHOOSING [facility]  [place] [facility]  [phone]    Summary: You were admitted to [facility] on 4/11/2021 for detoxification from alcohol.  A medical exam was performed that included lab work. You have met with a  and opted to attend IOP treatment at [facility].  Please take care and make your recovery a daily priority. It was a pleasure working with you and the entire treatment team here wishes you the very best in your recovery, Miss Chaudhary.    Recommendation:  Enter and complete IOP treatment at [facility] and follow all recommendations of your treatment providers.      Main Diagnoses:  Alcohol Withdrawal:COMPLICATED; Anxiety NOS; Eating disorder NOS    Major Treatments, Procedures and Findings:  You were treated for alcohol detoxification using Valium per [facility] protocol. You completed a chemical dependency assessment. You had labs drawn and those results were reviewed with you. Please take a copy of your lab work with you to your next primary provider appointment.    Symptoms to Report:  If you experience more anxiety, confusion, sleeplessness, deep sadness or thoughts of suicide, notify your treatment team or notify your primary care provider. IF ANY OF THE SYMPTOMS YOU ARE EXPERIENCING ARE A MEDICAL EMERGENCY CALL 911 IMMEDIATELY.     Lifestyle Adjustment: Adjust your lifestyle to get enough sleep, relaxation, exercise and good nutrition. Continue to develop healthy coping skills to decrease stress and promote a sober living environment. Do not use mood altering substances including alcohol, illegal drugs or addictive medications other than what is currently prescribed.     Facts about COVID19 at www.cdc.gov/COVID19 and www.[place].gov/covid19  Keeping hands clean is one of the most important steps we can take to avoid getting sick and spreading germs to others.  Please wash your hands frequently and lather with soap for at least 20  seconds!  MEDICAL/PRIMARY CARE FOLLOW UP: PATIENT STATES THAT SHE PREFERS TO SCHEDULE FOLLOW UP CARE ONCE DISCHARGESD FROM FACILITY, AND IN NEW TREATMENT CONTEXT.  [facility]  Assessment: You will need to call [facility] approx 7:30 AM or later to get scheduled and start an assessment. Then they will get you scheduled for outpatient treatment.   [facility] provides holistic client care in the treatment of co-occurring mental health and addiction needs in a trauma-informed, gender-responsive, and client-empowered setting. [address] [place] [address]  Phone: [phone] Fax: [phone]     Recovery apps for your phone to locate current in person and zoom recovery meetings  [place] - meeting bouchra  AA  - meeting bouchra  Meeting guide - meeting bouchra  Quick NA meeting - meeting bouchra Tomas  *due to covid-19 most AA/NA meetings are being held online*  AA meetings online search for them at: https://aa-intergroup.org (worldwide meeting listings)  AA meetings for [place] area can be found online at: https://aaminneaEcosiais.org (click local online meetings listings)  NA meetings for [place] area can be found online at: https://www.nam[place].org  (click find a meeting)  AA and NA Sponsors are excellent resources for support and you can find one at any support group meeting.   Alcoholics Anonymous (https://aa.org/): for information 24 hours/day  AA Intergroup service office in [place] (http://www.aastpaul.org/) [phone]  AA Intergroup service office in [facility]: [phone]. (http://www.aamin[place].org/)  Narcotics Anonymous (www.nam[place].org) (522) 632-3560  https://aafairviewriverside.org/meetings  SMART Recovery - self management for addiction recovery:  www.smartrecovery.org  Pathways ~ A Health Crisis Resource & Support Center:  692.475.5532.  https://prescribetoprevent.org/patient-education/videos/  http://www.harmreduction.org  [facility] [phone]  Support Group:  AA/NA  and Sponsor/support.  National [place] on Mental Illness (www.[place].sergio.org): 613.324.1683 or 041-596-9631.  Alcoholics Anonymous (www.alcoholics-anonymous.org): Check your phone book for your local chapter.  Suicide Awareness Voices of Education (SAVE) (www.save.org): 297-506-AVUG (5700)  National Suicide Prevention Line (www.mentalhealthmn.org): 408-190-PRSQ (1503)  Mental Health Consumer/Survivor Network of [place] (www.mhcsn.net): 444.331.8599 or 860-645-4496  Mental Health Association of [place] (www.mentalhealth.org): 840.864.5227 or 965-896-4989   Substance Abuse and Mental Health Services (www.samhsa.gov)  [place] Opioid Prevention Coalition: www.opioidcoalition.org    [place] Recovery Connection ([facility])  [facility] connects people seeking recovery to resources that help foster and sustain long-term recovery.  Whether you are seeking resources for treatment, transportation, housing, job training, education, health care or other pathways to recovery, [facility] is a great place to start.  178.281.5651.  www.[place].org    Great Pod casts for nutrition and wellness  Listen on Apple Podcasts  [employer]   Understand the connection between what you eat and how you feel. Hosted by licensed nutritionists and dietitians from [employer] we share practical, real-life solutions for healthier living through nutrition.     General Medication Instructions:  See your medication sheet(s) for instructions.   Take all medications as prescribed.  Make no changes unless your primary care provider suggests them.   Go to all your primary care provider visits. Be sure to have all your required lab tests. This way, your medicines can be refilled on time. Do not use alcohol.    Please Note: If you have any questions at anytime after you are discharged please call [facility] detox unit 3AW at [phone].  ANNETTE Toscano, Behavioral Intake [phone]  Medical Records  call [phone]  Outpatient Behavioral Intake call [phone]  LP+ Wait List/Bed Availability call [phone]    Please remember to take all of your behavioral discharge planning and lab paperwork to any follow up appointments, it contains your lab results, diagnosis, medication list and discharge recommendations.  THANK YOU FOR CHOOSING [facility] .

## 2021-04-13 NOTE — PLAN OF CARE
Patient has been without need of withdrawal medication for over   24 hours' time, and has thus graduated to 'out of detox' status.

## 2021-09-25 ENCOUNTER — HEALTH MAINTENANCE LETTER (OUTPATIENT)
Age: 36
End: 2021-09-25

## 2022-05-07 ENCOUNTER — HEALTH MAINTENANCE LETTER (OUTPATIENT)
Age: 37
End: 2022-05-07

## 2022-08-27 ENCOUNTER — HEALTH MAINTENANCE LETTER (OUTPATIENT)
Age: 37
End: 2022-08-27

## 2022-09-29 ENCOUNTER — LAB REQUISITION (OUTPATIENT)
Dept: LAB | Facility: CLINIC | Age: 37
End: 2022-09-29

## 2022-09-29 ENCOUNTER — LAB REQUISITION (OUTPATIENT)
Dept: LAB | Facility: CLINIC | Age: 37
End: 2022-09-29
Payer: COMMERCIAL

## 2022-09-29 DIAGNOSIS — Z36.89 ENCOUNTER FOR OTHER SPECIFIED ANTENATAL SCREENING: ICD-10-CM

## 2022-09-29 LAB
GLUCOSE 1H P 50 G GLC PO SERPL-MCNC: 116 MG/DL (ref 70–129)
HGB BLD-MCNC: 9.7 G/DL (ref 11.7–15.7)

## 2022-09-29 PROCEDURE — 82950 GLUCOSE TEST: CPT | Performed by: OBSTETRICS & GYNECOLOGY

## 2022-09-29 PROCEDURE — 86780 TREPONEMA PALLIDUM: CPT | Performed by: OBSTETRICS & GYNECOLOGY

## 2022-09-29 PROCEDURE — 85018 HEMOGLOBIN: CPT | Mod: ORL | Performed by: OBSTETRICS & GYNECOLOGY

## 2022-09-30 LAB — T PALLIDUM AB SER QL: NONREACTIVE

## 2022-11-29 ENCOUNTER — LAB REQUISITION (OUTPATIENT)
Dept: LAB | Facility: CLINIC | Age: 37
End: 2022-11-29

## 2022-11-29 DIAGNOSIS — O99.019 ANEMIA COMPLICATING PREGNANCY, UNSPECIFIED TRIMESTER: ICD-10-CM

## 2022-11-29 DIAGNOSIS — Z3A.36 36 WEEKS GESTATION OF PREGNANCY: ICD-10-CM

## 2022-11-29 LAB
BASOPHILS # BLD AUTO: 0.1 10E3/UL (ref 0–0.2)
BASOPHILS NFR BLD AUTO: 1 %
EOSINOPHIL # BLD AUTO: 0.1 10E3/UL (ref 0–0.7)
EOSINOPHIL NFR BLD AUTO: 1 %
ERYTHROCYTE [DISTWIDTH] IN BLOOD BY AUTOMATED COUNT: 20.8 % (ref 10–15)
HCT VFR BLD AUTO: 35.6 % (ref 35–47)
HGB BLD-MCNC: 11 G/DL (ref 11.7–15.7)
IMM GRANULOCYTES # BLD: 0.1 10E3/UL
IMM GRANULOCYTES NFR BLD: 1 %
LYMPHOCYTES # BLD AUTO: 2 10E3/UL (ref 0.8–5.3)
LYMPHOCYTES NFR BLD AUTO: 23 %
MCH RBC QN AUTO: 25.6 PG (ref 26.5–33)
MCHC RBC AUTO-ENTMCNC: 30.9 G/DL (ref 31.5–36.5)
MCV RBC AUTO: 83 FL (ref 78–100)
MONOCYTES # BLD AUTO: 0.6 10E3/UL (ref 0–1.3)
MONOCYTES NFR BLD AUTO: 7 %
NEUTROPHILS # BLD AUTO: 5.6 10E3/UL (ref 1.6–8.3)
NEUTROPHILS NFR BLD AUTO: 67 %
NRBC # BLD AUTO: 0 10E3/UL
NRBC BLD AUTO-RTO: 0 /100
PLATELET # BLD AUTO: 191 10E3/UL (ref 150–450)
RBC # BLD AUTO: 4.29 10E6/UL (ref 3.8–5.2)
WBC # BLD AUTO: 8.4 10E3/UL (ref 4–11)

## 2022-11-29 PROCEDURE — 85004 AUTOMATED DIFF WBC COUNT: CPT | Performed by: OBSTETRICS & GYNECOLOGY

## 2022-12-26 ENCOUNTER — HEALTH MAINTENANCE LETTER (OUTPATIENT)
Age: 37
End: 2022-12-26

## 2022-12-27 ENCOUNTER — HOSPITAL ENCOUNTER (INPATIENT)
Facility: CLINIC | Age: 37
LOS: 2 days | Discharge: HOME HEALTH | End: 2022-12-29
Attending: OBSTETRICS & GYNECOLOGY | Admitting: OBSTETRICS & GYNECOLOGY
Payer: COMMERCIAL

## 2022-12-27 ENCOUNTER — ANESTHESIA EVENT (OUTPATIENT)
Dept: OBGYN | Facility: CLINIC | Age: 37
End: 2022-12-27
Payer: COMMERCIAL

## 2022-12-27 ENCOUNTER — ANESTHESIA (OUTPATIENT)
Dept: OBGYN | Facility: CLINIC | Age: 37
End: 2022-12-27
Payer: COMMERCIAL

## 2022-12-27 LAB
ABO/RH(D): NORMAL
ANTIBODY SCREEN: NEGATIVE
ERYTHROCYTE [DISTWIDTH] IN BLOOD BY AUTOMATED COUNT: 21 % (ref 10–15)
HCT VFR BLD AUTO: 35.1 % (ref 35–47)
HGB BLD-MCNC: 11.1 G/DL (ref 11.7–15.7)
MCH RBC QN AUTO: 27 PG (ref 26.5–33)
MCHC RBC AUTO-ENTMCNC: 31.6 G/DL (ref 31.5–36.5)
MCV RBC AUTO: 85 FL (ref 78–100)
PLATELET # BLD AUTO: 175 10E3/UL (ref 150–450)
RBC # BLD AUTO: 4.11 10E6/UL (ref 3.8–5.2)
SPECIMEN EXPIRATION DATE: NORMAL
WBC # BLD AUTO: 13.1 10E3/UL (ref 4–11)

## 2022-12-27 PROCEDURE — G0463 HOSPITAL OUTPT CLINIC VISIT: HCPCS | Mod: 25

## 2022-12-27 PROCEDURE — 86901 BLOOD TYPING SEROLOGIC RH(D): CPT | Performed by: OBSTETRICS & GYNECOLOGY

## 2022-12-27 PROCEDURE — 86780 TREPONEMA PALLIDUM: CPT | Performed by: OBSTETRICS & GYNECOLOGY

## 2022-12-27 PROCEDURE — 370N000003 HC ANESTHESIA WARD SERVICE

## 2022-12-27 PROCEDURE — 258N000003 HC RX IP 258 OP 636: Performed by: OBSTETRICS & GYNECOLOGY

## 2022-12-27 PROCEDURE — 00HU33Z INSERTION OF INFUSION DEVICE INTO SPINAL CANAL, PERCUTANEOUS APPROACH: ICD-10-PCS | Performed by: ANESTHESIOLOGY

## 2022-12-27 PROCEDURE — 250N000011 HC RX IP 250 OP 636: Performed by: ANESTHESIOLOGY

## 2022-12-27 PROCEDURE — 3E0R3BZ INTRODUCTION OF ANESTHETIC AGENT INTO SPINAL CANAL, PERCUTANEOUS APPROACH: ICD-10-PCS | Performed by: ANESTHESIOLOGY

## 2022-12-27 PROCEDURE — 85027 COMPLETE CBC AUTOMATED: CPT | Performed by: OBSTETRICS & GYNECOLOGY

## 2022-12-27 PROCEDURE — 120N000001 HC R&B MED SURG/OB

## 2022-12-27 PROCEDURE — 59025 FETAL NON-STRESS TEST: CPT

## 2022-12-27 PROCEDURE — 250N000009 HC RX 250: Performed by: ANESTHESIOLOGY

## 2022-12-27 PROCEDURE — 258N000003 HC RX IP 258 OP 636: Performed by: ANESTHESIOLOGY

## 2022-12-27 PROCEDURE — 36415 COLL VENOUS BLD VENIPUNCTURE: CPT | Performed by: OBSTETRICS & GYNECOLOGY

## 2022-12-27 RX ORDER — FERROUS SULFATE 325(65) MG
325 TABLET ORAL
Status: ON HOLD | COMMUNITY
End: 2022-12-29

## 2022-12-27 RX ORDER — CARBOPROST TROMETHAMINE 250 UG/ML
250 INJECTION, SOLUTION INTRAMUSCULAR
Status: DISCONTINUED | OUTPATIENT
Start: 2022-12-27 | End: 2022-12-28 | Stop reason: HOSPADM

## 2022-12-27 RX ORDER — MISOPROSTOL 200 UG/1
800 TABLET ORAL
Status: DISCONTINUED | OUTPATIENT
Start: 2022-12-27 | End: 2022-12-28 | Stop reason: HOSPADM

## 2022-12-27 RX ORDER — VALACYCLOVIR HYDROCHLORIDE 500 MG/1
500 TABLET, FILM COATED ORAL 2 TIMES DAILY
Status: ON HOLD | COMMUNITY
End: 2022-12-29

## 2022-12-27 RX ORDER — LIDOCAINE HYDROCHLORIDE AND EPINEPHRINE 15; 5 MG/ML; UG/ML
INJECTION, SOLUTION EPIDURAL PRN
Status: DISCONTINUED | OUTPATIENT
Start: 2022-12-27 | End: 2022-12-28

## 2022-12-27 RX ORDER — ONDANSETRON 2 MG/ML
4 INJECTION INTRAMUSCULAR; INTRAVENOUS EVERY 6 HOURS PRN
Status: DISCONTINUED | OUTPATIENT
Start: 2022-12-27 | End: 2022-12-27 | Stop reason: HOSPADM

## 2022-12-27 RX ORDER — DIPHENHYDRAMINE HCL 25 MG
25 CAPSULE ORAL EVERY 6 HOURS PRN
Status: DISCONTINUED | OUTPATIENT
Start: 2022-12-27 | End: 2022-12-29 | Stop reason: HOSPADM

## 2022-12-27 RX ORDER — OXYTOCIN/0.9 % SODIUM CHLORIDE 30/500 ML
100-340 PLASTIC BAG, INJECTION (ML) INTRAVENOUS CONTINUOUS PRN
Status: DISCONTINUED | OUTPATIENT
Start: 2022-12-27 | End: 2022-12-28

## 2022-12-27 RX ORDER — METOCLOPRAMIDE HYDROCHLORIDE 5 MG/ML
10 INJECTION INTRAMUSCULAR; INTRAVENOUS EVERY 6 HOURS PRN
Status: DISCONTINUED | OUTPATIENT
Start: 2022-12-27 | End: 2022-12-28 | Stop reason: HOSPADM

## 2022-12-27 RX ORDER — NALOXONE HYDROCHLORIDE 0.4 MG/ML
0.2 INJECTION, SOLUTION INTRAMUSCULAR; INTRAVENOUS; SUBCUTANEOUS
Status: DISCONTINUED | OUTPATIENT
Start: 2022-12-27 | End: 2022-12-28 | Stop reason: HOSPADM

## 2022-12-27 RX ORDER — FENTANYL CITRATE 50 UG/ML
INJECTION, SOLUTION INTRAMUSCULAR; INTRAVENOUS
Status: COMPLETED | OUTPATIENT
Start: 2022-12-27 | End: 2022-12-27

## 2022-12-27 RX ORDER — DOCUSATE SODIUM 100 MG/1
100 CAPSULE, LIQUID FILLED ORAL 2 TIMES DAILY
COMMUNITY

## 2022-12-27 RX ORDER — MISOPROSTOL 200 UG/1
400 TABLET ORAL
Status: DISCONTINUED | OUTPATIENT
Start: 2022-12-27 | End: 2022-12-28 | Stop reason: HOSPADM

## 2022-12-27 RX ORDER — NALOXONE HYDROCHLORIDE 0.4 MG/ML
0.4 INJECTION, SOLUTION INTRAMUSCULAR; INTRAVENOUS; SUBCUTANEOUS
Status: DISCONTINUED | OUTPATIENT
Start: 2022-12-27 | End: 2022-12-28 | Stop reason: HOSPADM

## 2022-12-27 RX ORDER — METOCLOPRAMIDE 10 MG/1
10 TABLET ORAL EVERY 6 HOURS PRN
Status: DISCONTINUED | OUTPATIENT
Start: 2022-12-27 | End: 2022-12-27 | Stop reason: HOSPADM

## 2022-12-27 RX ORDER — IBUPROFEN 400 MG/1
800 TABLET, FILM COATED ORAL
Status: DISCONTINUED | OUTPATIENT
Start: 2022-12-27 | End: 2022-12-28

## 2022-12-27 RX ORDER — PROCHLORPERAZINE MALEATE 5 MG
10 TABLET ORAL EVERY 6 HOURS PRN
Status: DISCONTINUED | OUTPATIENT
Start: 2022-12-27 | End: 2022-12-27 | Stop reason: HOSPADM

## 2022-12-27 RX ORDER — ONDANSETRON 4 MG/1
4 TABLET, ORALLY DISINTEGRATING ORAL EVERY 6 HOURS PRN
Status: DISCONTINUED | OUTPATIENT
Start: 2022-12-27 | End: 2022-12-28 | Stop reason: HOSPADM

## 2022-12-27 RX ORDER — ONDANSETRON 2 MG/ML
4 INJECTION INTRAMUSCULAR; INTRAVENOUS EVERY 6 HOURS PRN
Status: DISCONTINUED | OUTPATIENT
Start: 2022-12-27 | End: 2022-12-28 | Stop reason: HOSPADM

## 2022-12-27 RX ORDER — ROPIVACAINE HYDROCHLORIDE 2 MG/ML
10 INJECTION, SOLUTION EPIDURAL; INFILTRATION; PERINEURAL ONCE
Status: DISCONTINUED | OUTPATIENT
Start: 2022-12-27 | End: 2022-12-28 | Stop reason: HOSPADM

## 2022-12-27 RX ORDER — FENTANYL CITRATE 50 UG/ML
100 INJECTION, SOLUTION INTRAMUSCULAR; INTRAVENOUS ONCE
Status: DISCONTINUED | OUTPATIENT
Start: 2022-12-27 | End: 2022-12-28 | Stop reason: HOSPADM

## 2022-12-27 RX ORDER — CITRIC ACID/SODIUM CITRATE 334-500MG
30 SOLUTION, ORAL ORAL
Status: DISCONTINUED | OUTPATIENT
Start: 2022-12-27 | End: 2022-12-28 | Stop reason: HOSPADM

## 2022-12-27 RX ORDER — METHYLERGONOVINE MALEATE 0.2 MG/ML
200 INJECTION INTRAVENOUS
Status: DISCONTINUED | OUTPATIENT
Start: 2022-12-27 | End: 2022-12-28 | Stop reason: HOSPADM

## 2022-12-27 RX ORDER — SODIUM CHLORIDE, SODIUM LACTATE, POTASSIUM CHLORIDE, CALCIUM CHLORIDE 600; 310; 30; 20 MG/100ML; MG/100ML; MG/100ML; MG/100ML
INJECTION, SOLUTION INTRAVENOUS CONTINUOUS
Status: DISCONTINUED | OUTPATIENT
Start: 2022-12-27 | End: 2022-12-28 | Stop reason: HOSPADM

## 2022-12-27 RX ORDER — PROCHLORPERAZINE 25 MG
25 SUPPOSITORY, RECTAL RECTAL EVERY 12 HOURS PRN
Status: DISCONTINUED | OUTPATIENT
Start: 2022-12-27 | End: 2022-12-27 | Stop reason: HOSPADM

## 2022-12-27 RX ORDER — METOCLOPRAMIDE 10 MG/1
10 TABLET ORAL EVERY 6 HOURS PRN
Status: DISCONTINUED | OUTPATIENT
Start: 2022-12-27 | End: 2022-12-28 | Stop reason: HOSPADM

## 2022-12-27 RX ORDER — OXYTOCIN 10 [USP'U]/ML
10 INJECTION, SOLUTION INTRAMUSCULAR; INTRAVENOUS
Status: DISCONTINUED | OUTPATIENT
Start: 2022-12-27 | End: 2022-12-28 | Stop reason: HOSPADM

## 2022-12-27 RX ORDER — OXYTOCIN 10 [USP'U]/ML
10 INJECTION, SOLUTION INTRAMUSCULAR; INTRAVENOUS
Status: DISCONTINUED | OUTPATIENT
Start: 2022-12-27 | End: 2022-12-28

## 2022-12-27 RX ORDER — LIDOCAINE 40 MG/G
CREAM TOPICAL
Status: DISCONTINUED | OUTPATIENT
Start: 2022-12-27 | End: 2022-12-28 | Stop reason: HOSPADM

## 2022-12-27 RX ORDER — TRANEXAMIC ACID 10 MG/ML
1 INJECTION, SOLUTION INTRAVENOUS EVERY 30 MIN PRN
Status: DISCONTINUED | OUTPATIENT
Start: 2022-12-27 | End: 2022-12-28 | Stop reason: HOSPADM

## 2022-12-27 RX ORDER — PROCHLORPERAZINE MALEATE 5 MG
10 TABLET ORAL EVERY 6 HOURS PRN
Status: DISCONTINUED | OUTPATIENT
Start: 2022-12-27 | End: 2022-12-28 | Stop reason: HOSPADM

## 2022-12-27 RX ORDER — DIPHENHYDRAMINE HYDROCHLORIDE 50 MG/ML
25 INJECTION INTRAMUSCULAR; INTRAVENOUS EVERY 6 HOURS PRN
Status: DISCONTINUED | OUTPATIENT
Start: 2022-12-27 | End: 2022-12-29 | Stop reason: HOSPADM

## 2022-12-27 RX ORDER — ROPIVACAINE HYDROCHLORIDE 2 MG/ML
INJECTION, SOLUTION EPIDURAL; INFILTRATION; PERINEURAL
Status: COMPLETED | OUTPATIENT
Start: 2022-12-27 | End: 2022-12-27

## 2022-12-27 RX ORDER — ONDANSETRON 4 MG/1
4 TABLET, ORALLY DISINTEGRATING ORAL EVERY 6 HOURS PRN
Status: DISCONTINUED | OUTPATIENT
Start: 2022-12-27 | End: 2022-12-27 | Stop reason: HOSPADM

## 2022-12-27 RX ORDER — OXYTOCIN/0.9 % SODIUM CHLORIDE 30/500 ML
340 PLASTIC BAG, INJECTION (ML) INTRAVENOUS CONTINUOUS PRN
Status: DISCONTINUED | OUTPATIENT
Start: 2022-12-27 | End: 2022-12-28 | Stop reason: HOSPADM

## 2022-12-27 RX ORDER — METOCLOPRAMIDE HYDROCHLORIDE 5 MG/ML
10 INJECTION INTRAMUSCULAR; INTRAVENOUS EVERY 6 HOURS PRN
Status: DISCONTINUED | OUTPATIENT
Start: 2022-12-27 | End: 2022-12-27 | Stop reason: HOSPADM

## 2022-12-27 RX ORDER — PROCHLORPERAZINE 25 MG
25 SUPPOSITORY, RECTAL RECTAL EVERY 12 HOURS PRN
Status: DISCONTINUED | OUTPATIENT
Start: 2022-12-27 | End: 2022-12-28 | Stop reason: HOSPADM

## 2022-12-27 RX ORDER — KETOROLAC TROMETHAMINE 30 MG/ML
30 INJECTION, SOLUTION INTRAMUSCULAR; INTRAVENOUS
Status: DISCONTINUED | OUTPATIENT
Start: 2022-12-27 | End: 2022-12-28

## 2022-12-27 RX ORDER — EPHEDRINE SULFATE 50 MG/ML
5 INJECTION, SOLUTION INTRAMUSCULAR; INTRAVENOUS; SUBCUTANEOUS
Status: DISCONTINUED | OUTPATIENT
Start: 2022-12-27 | End: 2022-12-28 | Stop reason: HOSPADM

## 2022-12-27 RX ADMIN — ROPIVACAINE HYDROCHLORIDE 10 ML: 2 INJECTION, SOLUTION EPIDURAL; INFILTRATION at 23:09

## 2022-12-27 RX ADMIN — SODIUM CHLORIDE, POTASSIUM CHLORIDE, SODIUM LACTATE AND CALCIUM CHLORIDE: 600; 310; 30; 20 INJECTION, SOLUTION INTRAVENOUS at 22:40

## 2022-12-27 RX ADMIN — LIDOCAINE HYDROCHLORIDE AND EPINEPHRINE 3 ML: 15; 5 INJECTION, SOLUTION EPIDURAL at 23:00

## 2022-12-27 RX ADMIN — FENTANYL CITRATE 100 MCG: 50 INJECTION, SOLUTION INTRAMUSCULAR; INTRAVENOUS at 23:09

## 2022-12-27 RX ADMIN — LIDOCAINE HYDROCHLORIDE AND EPINEPHRINE 3 ML: 15; 5 INJECTION, SOLUTION EPIDURAL at 23:07

## 2022-12-27 RX ADMIN — EPHEDRINE SULFATE 5 MG: 5 INJECTION INTRAVENOUS at 23:31

## 2022-12-27 RX ADMIN — Medication: at 23:08

## 2022-12-27 RX ADMIN — SODIUM CHLORIDE, POTASSIUM CHLORIDE, SODIUM LACTATE AND CALCIUM CHLORIDE 1000 ML: 600; 310; 30; 20 INJECTION, SOLUTION INTRAVENOUS at 22:05

## 2022-12-27 ASSESSMENT — ACTIVITIES OF DAILY LIVING (ADL)
ADLS_ACUITY_SCORE: 22
ADLS_ACUITY_SCORE: 22

## 2022-12-28 VITALS
TEMPERATURE: 97.9 F | WEIGHT: 191 LBS | HEIGHT: 68 IN | SYSTOLIC BLOOD PRESSURE: 113 MMHG | HEART RATE: 77 BPM | RESPIRATION RATE: 16 BRPM | BODY MASS INDEX: 28.95 KG/M2 | DIASTOLIC BLOOD PRESSURE: 74 MMHG

## 2022-12-28 LAB
HGB BLD-MCNC: 10.8 G/DL (ref 11.7–15.7)
T PALLIDUM AB SER QL: NONREACTIVE

## 2022-12-28 PROCEDURE — 85018 HEMOGLOBIN: CPT | Performed by: OBSTETRICS & GYNECOLOGY

## 2022-12-28 PROCEDURE — 120N000012 HC R&B POSTPARTUM

## 2022-12-28 PROCEDURE — 250N000009 HC RX 250: Performed by: OBSTETRICS & GYNECOLOGY

## 2022-12-28 PROCEDURE — 250N000013 HC RX MED GY IP 250 OP 250 PS 637: Performed by: OBSTETRICS & GYNECOLOGY

## 2022-12-28 PROCEDURE — 722N000001 HC LABOR CARE VAGINAL DELIVERY SINGLE

## 2022-12-28 PROCEDURE — 258N000003 HC RX IP 258 OP 636: Performed by: OBSTETRICS & GYNECOLOGY

## 2022-12-28 PROCEDURE — 0KQM0ZZ REPAIR PERINEUM MUSCLE, OPEN APPROACH: ICD-10-PCS | Performed by: OBSTETRICS & GYNECOLOGY

## 2022-12-28 PROCEDURE — 36415 COLL VENOUS BLD VENIPUNCTURE: CPT | Performed by: OBSTETRICS & GYNECOLOGY

## 2022-12-28 RX ORDER — OXYTOCIN 10 [USP'U]/ML
10 INJECTION, SOLUTION INTRAMUSCULAR; INTRAVENOUS
Status: DISCONTINUED | OUTPATIENT
Start: 2022-12-28 | End: 2022-12-29 | Stop reason: HOSPADM

## 2022-12-28 RX ORDER — MISOPROSTOL 200 UG/1
400 TABLET ORAL
Status: DISCONTINUED | OUTPATIENT
Start: 2022-12-28 | End: 2022-12-29 | Stop reason: HOSPADM

## 2022-12-28 RX ORDER — TRANEXAMIC ACID 10 MG/ML
1 INJECTION, SOLUTION INTRAVENOUS EVERY 30 MIN PRN
Status: DISCONTINUED | OUTPATIENT
Start: 2022-12-28 | End: 2022-12-29 | Stop reason: HOSPADM

## 2022-12-28 RX ORDER — SODIUM CHLORIDE, SODIUM LACTATE, POTASSIUM CHLORIDE, CALCIUM CHLORIDE 600; 310; 30; 20 MG/100ML; MG/100ML; MG/100ML; MG/100ML
INJECTION, SOLUTION INTRAVENOUS CONTINUOUS PRN
Status: DISCONTINUED | OUTPATIENT
Start: 2022-12-28 | End: 2022-12-28 | Stop reason: HOSPADM

## 2022-12-28 RX ORDER — IBUPROFEN 400 MG/1
800 TABLET, FILM COATED ORAL EVERY 6 HOURS PRN
Status: DISCONTINUED | OUTPATIENT
Start: 2022-12-28 | End: 2022-12-29 | Stop reason: HOSPADM

## 2022-12-28 RX ORDER — CARBOPROST TROMETHAMINE 250 UG/ML
250 INJECTION, SOLUTION INTRAMUSCULAR
Status: DISCONTINUED | OUTPATIENT
Start: 2022-12-28 | End: 2022-12-29 | Stop reason: HOSPADM

## 2022-12-28 RX ORDER — MISOPROSTOL 200 UG/1
800 TABLET ORAL
Status: DISCONTINUED | OUTPATIENT
Start: 2022-12-28 | End: 2022-12-29 | Stop reason: HOSPADM

## 2022-12-28 RX ORDER — ACETAMINOPHEN 325 MG/1
650 TABLET ORAL EVERY 4 HOURS PRN
Status: DISCONTINUED | OUTPATIENT
Start: 2022-12-28 | End: 2022-12-29 | Stop reason: HOSPADM

## 2022-12-28 RX ORDER — OXYTOCIN/0.9 % SODIUM CHLORIDE 30/500 ML
340 PLASTIC BAG, INJECTION (ML) INTRAVENOUS CONTINUOUS PRN
Status: DISCONTINUED | OUTPATIENT
Start: 2022-12-28 | End: 2022-12-29 | Stop reason: HOSPADM

## 2022-12-28 RX ORDER — OXYTOCIN/0.9 % SODIUM CHLORIDE 30/500 ML
1-24 PLASTIC BAG, INJECTION (ML) INTRAVENOUS CONTINUOUS
Status: DISCONTINUED | OUTPATIENT
Start: 2022-12-28 | End: 2022-12-28 | Stop reason: HOSPADM

## 2022-12-28 RX ORDER — BISACODYL 10 MG
10 SUPPOSITORY, RECTAL RECTAL DAILY PRN
Status: DISCONTINUED | OUTPATIENT
Start: 2022-12-28 | End: 2022-12-29 | Stop reason: HOSPADM

## 2022-12-28 RX ORDER — DOCUSATE SODIUM 100 MG/1
100 CAPSULE, LIQUID FILLED ORAL DAILY
Status: DISCONTINUED | OUTPATIENT
Start: 2022-12-28 | End: 2022-12-29 | Stop reason: HOSPADM

## 2022-12-28 RX ORDER — METHYLERGONOVINE MALEATE 0.2 MG/ML
200 INJECTION INTRAVENOUS
Status: DISCONTINUED | OUTPATIENT
Start: 2022-12-28 | End: 2022-12-29 | Stop reason: HOSPADM

## 2022-12-28 RX ORDER — MODIFIED LANOLIN
OINTMENT (GRAM) TOPICAL
Status: DISCONTINUED | OUTPATIENT
Start: 2022-12-28 | End: 2022-12-29 | Stop reason: HOSPADM

## 2022-12-28 RX ORDER — LIDOCAINE 40 MG/G
CREAM TOPICAL
Status: DISCONTINUED | OUTPATIENT
Start: 2022-12-28 | End: 2022-12-28 | Stop reason: HOSPADM

## 2022-12-28 RX ORDER — HYDROCORTISONE 25 MG/G
CREAM TOPICAL 3 TIMES DAILY PRN
Status: DISCONTINUED | OUTPATIENT
Start: 2022-12-28 | End: 2022-12-29 | Stop reason: HOSPADM

## 2022-12-28 RX ORDER — PANTOPRAZOLE SODIUM 40 MG/1
40 TABLET, DELAYED RELEASE ORAL DAILY
Status: DISCONTINUED | OUTPATIENT
Start: 2022-12-28 | End: 2022-12-29 | Stop reason: HOSPADM

## 2022-12-28 RX ADMIN — IBUPROFEN 800 MG: 400 TABLET ORAL at 05:14

## 2022-12-28 RX ADMIN — SODIUM CHLORIDE, POTASSIUM CHLORIDE, SODIUM LACTATE AND CALCIUM CHLORIDE: 600; 310; 30; 20 INJECTION, SOLUTION INTRAVENOUS at 02:39

## 2022-12-28 RX ADMIN — ACETAMINOPHEN 650 MG: 325 TABLET, FILM COATED ORAL at 17:18

## 2022-12-28 RX ADMIN — IBUPROFEN 800 MG: 400 TABLET ORAL at 11:14

## 2022-12-28 RX ADMIN — IBUPROFEN 800 MG: 400 TABLET ORAL at 17:18

## 2022-12-28 RX ADMIN — ACETAMINOPHEN 650 MG: 325 TABLET, FILM COATED ORAL at 05:14

## 2022-12-28 RX ADMIN — ACETAMINOPHEN 650 MG: 325 TABLET, FILM COATED ORAL at 23:14

## 2022-12-28 RX ADMIN — DOCUSATE SODIUM 100 MG: 100 CAPSULE, LIQUID FILLED ORAL at 11:14

## 2022-12-28 RX ADMIN — Medication 2 MILLI-UNITS/MIN: at 02:40

## 2022-12-28 RX ADMIN — IBUPROFEN 800 MG: 400 TABLET ORAL at 23:14

## 2022-12-28 RX ADMIN — PANTOPRAZOLE SODIUM 40 MG: 40 TABLET, DELAYED RELEASE ORAL at 19:31

## 2022-12-28 RX ADMIN — ACETAMINOPHEN 650 MG: 325 TABLET, FILM COATED ORAL at 11:14

## 2022-12-28 ASSESSMENT — ACTIVITIES OF DAILY LIVING (ADL)
ADLS_ACUITY_SCORE: 22
ADLS_ACUITY_SCORE: 26
ADLS_ACUITY_SCORE: 22
ADLS_ACUITY_SCORE: 26

## 2022-12-28 NOTE — PLAN OF CARE
Assisted Pt up to bathroom with one assist. Pt denied feeling any dizziness when up. Pt voided 300 ml. Rekha cares performed. Pt walked back to bed with stand by assist.

## 2022-12-28 NOTE — PLAN OF CARE
Pt. admitted from L&D  via wheel chair and transferred to bed with one assist. Pt. arrived with baby and was accompanied by , nurse, and arrived with personal belongings. Report was taken from Angelica Felipe RN in L&D. VSS. Fundus is firm and midline.  Vaginal bleeding is light.  SL intact.  Pt. oriented to the room and call light system.

## 2022-12-28 NOTE — PLAN OF CARE
Problem: Labor  Goal: Hemostasis  Outcome: Met  Goal: Stable Fetal Wellbeing  Outcome: Met  Goal: Effective Progression to Delivery  Outcome: Met  Goal: Absence of Infection Signs and Symptoms  Outcome: Met  Goal: Acceptable Pain Control  Outcome: Met  Goal: Normal Uterine Contraction Pattern  Outcome: Met   Patient delivered vaginally.

## 2022-12-28 NOTE — ANESTHESIA PREPROCEDURE EVALUATION
Anesthesia Pre-Procedure Evaluation    Patient: Frida Chaudhary   MRN: [id number] : 1985        Procedure : * No procedures listed *          Past Medical History:   Diagnosis Date     Abnormal Pap smear     years ago, fine since     Anxiety      Depressive disorder      Herpes simplex without mention of complication     taking valtrex     Scoliosis       Past Surgical History:   Procedure Laterality Date     D & C       DENTAL SURGERY       KNEE SURGERY       ORTHOPEDIC SURGERY        No Known Allergies   Social History     Tobacco Use     Smoking status: Former     Smokeless tobacco: Former   Substance Use Topics     Alcohol use: Yes      Wt Readings from Last 1 Encounters:   22 86.6 kg (191 lb)        Anesthesia Evaluation            ROS/MED HX  ENT/Pulmonary:    (-) asthma   Neurologic:       Cardiovascular:    (-) PIH   METS/Exercise Tolerance:     Hematologic:     (+) no anticoagulation therapy, no coagulopathy,     Musculoskeletal:   (+) scoliosis,     GI/Hepatic:     (+) GERD,     Renal/Genitourinary:       Endo:       Psychiatric/Substance Use:     (+) psychiatric history depression alcohol abuse     Infectious Disease:       Malignancy:       Other:            Physical Exam    Airway        Mallampati: II   TM distance: > 3 FB   Neck ROM: full     Respiratory Devices and Support         Dental  no notable dental history         Cardiovascular   cardiovascular exam normal          Pulmonary   pulmonary exam normal                OUTSIDE LABS:  CBC:   Lab Results   Component Value Date    WBC 8.4 2022    WBC 4.0 2021    HGB 11.0 (L) 2022    HGB 9.7 (L) 2022    HCT 35.6 2022    HCT 34.0 (L) 2021     2022     2021     BMP:   Lab Results   Component Value Date     2021    POTASSIUM 3.4 2021    POTASSIUM 2.9 (L) 2021    CHLORIDE 101 2021    CO2 26 2021    BUN 6 (L) 2021    CR 0.57  04/11/2021     (H) 04/11/2021     COAGS: No results found for: PTT, INR, FIBR  POC:   Lab Results   Component Value Date    HCG Negative 04/11/2021     HEPATIC:   Lab Results   Component Value Date    ALBUMIN 3.7 04/11/2021    PROTTOTAL 7.3 04/11/2021    ALT 32 04/11/2021    AST 61 (H) 04/11/2021     (H) 04/12/2021    ALKPHOS 49 04/11/2021    BILITOTAL 0.6 04/11/2021     OTHER:   Lab Results   Component Value Date    PILY 8.4 (L) 04/11/2021    TSH 3.99 04/12/2021       Anesthesia Plan    ASA Status:  2      Anesthesia Type: Epidural.              Consents    Anesthesia Plan(s) and associated risks, benefits, and realistic alternatives discussed. Questions answered and patient/representative(s) expressed understanding.    - Discussed:     - Discussed with:  Patient         Postoperative Care            Comments:    Other Comments: Orders to manage the epidural infusion have been entered, and through coordination with the nurse, we will continute to manage and monitor the patient's labor epidural.  We will continuously be available to adjust as needed thruout the entire L&D process.             Vidhya Parra MD

## 2022-12-28 NOTE — PLAN OF CARE
Latest Reference Range & Units 12/28/22 08:24   Hemoglobin 11.7 - 15.7 g/dL 10.8 (L)     Updated the above lab result to Dr. Pinto. No new orders received at this time.

## 2022-12-28 NOTE — PROGRESS NOTES
"OB Post-partum Note  PPD# 0    S:  Patient doing well.  Pain controlled.  Voiding.  Bleeding is normal.  Breast feeding.    O:  /74 (BP Location: Left arm, Patient Position: Semi-Sharif's, Cuff Size: Adult Regular)   Pulse 62   Temp 97.8  F (36.6  C) (Oral)   Resp 16   Ht 1.727 m (5' 8\")   Wt 86.6 kg (191 lb)   Breastfeeding Unknown   BMI 29.04 kg/m    Gen- A&O, NAD  Abd- Non-tender, fundus non tender and firm   Ext- non-tender, no calf pain    Hemoglobin   Date Value Ref Range Status   2022 10.8 (L) 11.7 - 15.7 g/dL Final   2021 11.9 11.7 - 15.7 g/dL Final     A POS  Rubella Immune    A/P: 37 year old  PPD# 0 s/p  earlier this morning, doing well. Hemoglobin 3 hours after delivery 10.8.     1.  Routine post-partum cares  2.  Analgesia with Ibuprofen and Tylenol  3.  Discharge either PPD #1 or 2        Florencio Pinot MD  2022  1:23 PM  "

## 2022-12-28 NOTE — ANESTHESIA PROCEDURE NOTES
"Epidural catheter Procedure Note    Pre-Procedure   Staff -        Anesthesiologist:  Vidhya Parra MD       Performed By: anesthesiologist       Location: OB       Pre-Anesthestic Checklist: patient identified, IV checked, risks and benefits discussed, informed consent, monitors and equipment checked, pre-op evaluation and at physician/surgeon's request  Timeout:       Correct Patient: Yes        Correct Procedure: Yes        Correct Site: Yes        Correct Position: Yes   Procedure Documentation  Procedure: epidural catheter       Diagnosis: Labor Pain       Patient Position: sitting       Patient Prep/Sterile Barriers: sterile gloves, mask, patient draped, x3       Skin prep: Betadine       Local skin infiltrated with mL of 1% lidocaine.        Insertion Site: L3-4. (midline approach).       Technique: LORT saline        KULWANT at 5 cm.       Needle Type: TerraWiy needle       Needle Gauge: 17.        Needle Length (Inches): 5        Catheter: 19 G.          Catheter threaded easily.         3 cm epidural space.         Threaded 9 cm at skin.         # of attempts: 2 and  # of redirects:     Assessment/Narrative         Paresthesias: No.       Test dose of 3 mL lidocaine 1.5% w/ 1:200,000 epinephrine at.         Test dose negative, 3 minutes after injection, for signs of intravascular, subdural, or intrathecal injection.       Insertion/Infusion Method: LORT saline       Aspiration negative for Heme or CSF via Epidural Catheter.    Medication(s) Administered   0.2% Ropivacaine (Epidural) - EPIDURAL   10 mL - 12/27/2022 11:09:00 PM  Fentanyl PF (Epidural) - EPIDURAL   100 mcg - 12/27/2022 11:09:00 PM   Comments:  Test dose positive. Catheter pulled out. Epidural attempted again.   Catheter secured with adhesive spray, tegaderm, and tape after repeat test dose was negative.      FOR [facility] ([facility] ONLY:   Pain Team Contact information: please page the Pain Team Via [employer]. Search \"Pain\". During daytime hours, " please page the attending first. At night please page the resident first.

## 2022-12-28 NOTE — PROGRESS NOTES
Patient arrived to maternal assessment center ambulatory, with significant other, Duane.    Patient reports reason for visit is rule out labor.  Patient to bathroom to void then to MAC room 3 to change into gown.        Patient receives prenatal care with Erica PEDERSEN - history is per patient and care everywhere chart review.      G 6 P 3    40 weeks 1 days gestation    Prenatal record reviewed.        Verbal consent for EFM.    EFM applied for fetal well being with consent.    Uterine assessment completed, non-tender and palpates soft between contractions.      Patient reports fetal movement is present.  Patient denies backache, vaginal discharge, pelvic pressure, UTI symptoms, GI problems, bloody show, vaginal bleeding, edema, headache, visual disturbances, epigastric or URQ pain, and/or rupture of membranes.      Triage/Arrival assessment completed.    Cervical exam: 3/60/-2, recheck at  4/70/-2.        Dr Jackson paged with return call received.  Update included but not limited to: Patients arrival, patient presents to maternal assessment center.  She is a .  40.1 gestation.  Medical history significant for anxiety, depression, Obstetrical history significant for d&c.  Fetal heart tones with a 145 baseline, moderate variability, accelerations 15x15 present, no decelerations noted.  Contractions every 5 to 7 minutes.  Cervix: 70/-2.  Patient plans for epidural for pain control.  Patient is GBS negative    Plan per provider is to admit to labor and delivery, utilizing intrapartum orderset.      Collect labs of CBC with platelets & differential and Type & Screen.    Place IV access for continuous LR infusion for total IV fluids to be 125 ml/hr.  500 ml LR bolus times one for intrauterine resuscitation and/or uterine tachysystole.        Patient to room 213 ambulatory with significant other, Duane and personal belongings.        Report to Waleska AMEZQUITA RN care transferred at this  time.

## 2022-12-28 NOTE — PLAN OF CARE
Vital signs stable. Postpartum assessment WDL. Pain controlled with tylenol and ibuprofen. Patient voiding without difficulty. Working on breastfeeding. Patient and infant bonding well. Will continue with current plan of care.

## 2022-12-28 NOTE — PROVIDER NOTIFICATION
12/27/22 2223 12/27/22 2232 12/27/22 2235   Epidural Placement Care   Epidural Request/Placement provider called for placement anesthesiologist at bedside patient positioned   Anesthesiologist/CRNA Fidel Parra  --       12/27/22 2239 12/27/22 2247 12/27/22 2305   Epidural Placement Care   Epidural Request/Placement time out performed epidural test epidural test   Anesthesiologist/CRNA  --  Dr. Fidel Parra called updated pt request for epidural and history of scoliosis.  When Dr. Parra at bedside consents signed after reviewing procedure and questions answered.  Pt positioned and timeout performed.  Test dose at 2247 was POSITIVE.  This catheter was removed and a second one was placed.  Test dose at 2305 was NEGATIVE.  Pt positioned to left tilt.  Obtaining Frequent vitals.

## 2022-12-28 NOTE — H&P
"[facility] Labor and Delivery Progress Note    Frida Chaudhary MRN# [id number]   Age: 37 year old YOB: 1985     Refer to prenatal record for full H&P  Nursing assessment reviewed        Subjective:     37 yr  at 40+2 admitted in early labor. Pregnancy complicated by :  1. AMA    No comfortable with epidural          Objective:   Patient Vitals for the past 8 hrs:   BP Temp Temp src Resp Height Weight   22 0052 111/73 -- -- -- -- --   22 0023 102/62 -- -- -- -- --   22 107/68 -- -- -- -- --   22 110/72 -- -- -- -- --   22 107/68 -- -- -- -- --   22 -- 98.1  F (36.7  C) Temporal 16 -- --   22 93/51 -- -- -- -- --   22 (!) 81/46 -- -- -- -- --   22 (!) 86/47 -- -- -- -- --   22 97/54 -- -- -- -- --   22 122/57 -- -- -- -- --   22 108/56 -- -- -- -- --   22 108/55 -- -- -- -- --   22 134/67 -- -- -- -- --   22 112/65 -- -- -- -- --   22 138/63 -- -- -- -- --   22 136/71 -- -- -- -- --   22 135/69 -- -- -- -- --   22 124/64 -- -- -- -- --   22 (!) 145/73 -- -- -- -- --   22 (!) 135/95 -- -- -- -- --   22 129/76 -- -- -- -- --   22 125/68 -- -- -- -- --   22 131/74 -- -- -- -- --   22 131/72 -- -- -- -- --   22 133/79 -- -- -- -- --   22 127/79 99  F (37.2  C) -- -- 1.727 m (5' 8\") 86.6 kg (191 lb)        Cervical Exam:  4/70%/-2 ( on admission)    Membranes: Bulging     Fetal Heart Rate:    Monitor:   ext   Variability:   average   Baseline Rate:   140's   Fetal Heart Rate Tracing: cat 1    VTX  GBS negatiove  Rh +  EFW 8#          Assessment:   1)  IUP at  40w2d  2)  Active labor        Plan:   Pitocin as clinically indicated    Jazmine Lea Jackson MD        "

## 2022-12-28 NOTE — L&D DELIVERY NOTE
Delivery Date: 2022    DELIVERY DETAILS:  YOB: 2022.  Time of birth:  0434 a.m.  A female infant with a weight of 3810 grams and Apgars of 9 at 1 minute and 9 at 5 minutes.    HISTORY OF PRESENT ILLNESS:  The patient is a 37-year-old  6, para 3-0-2-3, who presented to Labor and Delivery in the evening of 2022 at 40 weeks 1 day gestation in active labor.    The patient had had a pregnancy followed at [facility] with the complication of advanced maternal age.    The patient's obstetrical history was significant for 3 uncomplicated term vaginal deliveries.    PRENATAL LABS:  Blood type A positive, antibody negative, serology nonreactive, rubella immune, and GBS negative.    On admission, the patient was noted to be 4 cm, 70%, -2.  She had a category 1 fetal heart tracing and stable maternal vital signs.  She was admitted and received an epidural.    The patient progressed to approximately 8 cm, dilated with a bulging bag.  She remained 8 cm for several hours.  She then had spontaneous rupture of membranes at 2:30 a.m. with clear fluid noted.  I was at the bedside at the time.  She was still noted to be 7-8 cm, 70%, -2.  Pitocin augmentation was begun.  She then rapidly progressed to be completely dilated at 4:09 a.m. and I was called to the bedside for delivery.  The patient began pushing at 4:21 a.m. and easily delivered a female infant over an intact perineum with anterior shoulder easily delivering at 4:34 a.m. with a weight of 3810 grams and Apgars of 9 and 9.    Following delivery of the baby, with fundal massage, placenta delivered spontaneously at 4:37 a.m.  It was noted to be intact and was sent for hospital disposal.    Vigorous fundal massage was performed.  IV Pitocin was hung.  Examination of the perineum and vagina revealed a small second-degree tear repaired using 3-0 Vicryl in normal fashion.    At the completion of the delivery, total QBL was 300 mL, mom and  baby doing well.    Jazmine Jackson MD        D: 2022   T: 2022   MT: [id number]    Name:     CHET WHITE  MRN:      [id number]        Account:       [id number]   :      1985           Delivery Date: 2022     Document: [id number]

## 2022-12-29 PROCEDURE — 250N000013 HC RX MED GY IP 250 OP 250 PS 637: Performed by: OBSTETRICS & GYNECOLOGY

## 2022-12-29 RX ADMIN — ACETAMINOPHEN 650 MG: 325 TABLET, FILM COATED ORAL at 12:13

## 2022-12-29 RX ADMIN — IBUPROFEN 800 MG: 400 TABLET ORAL at 06:25

## 2022-12-29 RX ADMIN — ACETAMINOPHEN 650 MG: 325 TABLET, FILM COATED ORAL at 06:25

## 2022-12-29 RX ADMIN — DOCUSATE SODIUM 100 MG: 100 CAPSULE, LIQUID FILLED ORAL at 08:23

## 2022-12-29 RX ADMIN — IBUPROFEN 800 MG: 400 TABLET ORAL at 12:13

## 2022-12-29 RX ADMIN — PANTOPRAZOLE SODIUM 40 MG: 40 TABLET, DELAYED RELEASE ORAL at 08:23

## 2022-12-29 ASSESSMENT — ACTIVITIES OF DAILY LIVING (ADL)
ADLS_ACUITY_SCORE: 22

## 2022-12-29 NOTE — PLAN OF CARE
Dr. Edmondson called back to the unit. Notified MD that Pt requested for Prilosec to help with heart burn. Dr. Edmondson stated that she will put the order in.

## 2022-12-29 NOTE — LACTATION NOTE
Lactation visit with Frida, FE, and baby girl. Getting ready for discharge. Frida reports feeding is going well. Frida reports that her nipples are sore. She is using nipple shells and lanolin. She has hydrogels, but has not used them yet. Education provided about the use of hydrogels. Discussed cluster feeding, what it is and when to expect it, The Second Night, satiety cues, feeding cues, and reviewed Feeding Log for home use. Encouraged to review Breastfeeding section in Your Guide to Postpartum & [place] [facility].    Reviewed milk supply and engorgement. Reviewed typical timeline of milk supply initiation and progression over first 3-5 days postpartum. Discussed comfort measures for engorgement, plugged duct treatment, and warning signs of breast infection. General questions answered regarding pumping, when it's helpful and necessary. Reviewed general recommendation to wait to start pumping until breastfeeding is well established unless there are feeding difficulties or engorgement not relieved by feeding baby or hand expression. Discussed introducing a bottle and recommendation to wait for bottle introduction for 3-4 weeks unless baby needs to supplement for medical reasons.    Feeding plan: Recommend unlimited, frequent breast feedings: At least 8 - 12 times every 24 hours. Avoid pacifiers and supplementation with formula unless medically indicated. Encouraged use of feeding log and to record feedings, and void/stool patterns. Frida has a breast pump for home use. Picking follow up clinic. Reviewed outpatient lactation resources.    Sandie Ray RN, IBCLC

## 2022-12-29 NOTE — PLAN OF CARE
Vital signs stable. Postpartum assessment WDL. Pain controlled with tylenol and ibuprofen, ice packs to perineum, PRN heat packs to abdomen for cramping. Patient ambulating independently, free of dizziness or lightheadedness. Patient voiding without difficulty. Breastfeeding on cue with no assist, nipples very sore and cracked- alternating hydrogels and lanolin with nipple shells, encouraged to alternate sides and positions. Patient and infant bonding well. Will continue with current plan of care.

## 2022-12-29 NOTE — PROGRESS NOTES
"OB Post-partum Note  PPD# 1    S:  Patient doing well.  Pain controlled.  Voiding.  Bleeding scant.  Breast feeding well.  Happy with birth, ready to go home.  Good family support.   planning on vasectomy, pt planning on POP's until  is cleared.  Mood stable, but has history of anxiety and depression    O:  /74 (BP Location: Left arm, Patient Position: Semi-Sharif's, Cuff Size: Adult Regular)   Pulse 77   Temp 97.9  F (36.6  C) (Oral)   Resp 16   Ht 1.727 m (5' 8\")   Wt 86.6 kg (191 lb)   Breastfeeding Unknown   BMI 29.04 kg/m    Gen- A&O, NAD  Abd- Non-tender, fundus firm at umbilicus  Ext- non-tender, no edema    Hemoglobin   Date Value Ref Range Status   2022 10.8 (L) 11.7 - 15.7 g/dL Final   2021 11.9 11.7 - 15.7 g/dL Final     A POS  Rubella Immune    A/P: 37 year old  PPD# 1 s/p , breastfeeding, hx of anxiety and depression  - post partum warnings/precautions reviewed  - OTC meds for discomfort  - RTC in 2 weeks for mood check in  - Discharge home today    Katie Mathew CNM  2022  8:08 AM  "

## 2023-09-17 ENCOUNTER — HEALTH MAINTENANCE LETTER (OUTPATIENT)
Age: 38
End: 2023-09-17

## 2024-02-07 ENCOUNTER — LAB REQUISITION (OUTPATIENT)
Dept: LAB | Facility: CLINIC | Age: 39
End: 2024-02-07

## 2024-02-07 DIAGNOSIS — R30.0 DYSURIA: ICD-10-CM

## 2024-02-07 PROCEDURE — 87086 URINE CULTURE/COLONY COUNT: CPT

## 2024-02-09 LAB — BACTERIA UR CULT: ABNORMAL

## 2024-08-27 ENCOUNTER — OFFICE VISIT (OUTPATIENT)
Dept: URGENT CARE | Facility: URGENT CARE | Age: 39
End: 2024-08-27
Payer: COMMERCIAL

## 2024-08-27 VITALS
BODY MASS INDEX: 22.44 KG/M2 | WEIGHT: 147.6 LBS | SYSTOLIC BLOOD PRESSURE: 110 MMHG | TEMPERATURE: 98.9 F | DIASTOLIC BLOOD PRESSURE: 63 MMHG | HEART RATE: 94 BPM | RESPIRATION RATE: 20 BRPM | OXYGEN SATURATION: 99 %

## 2024-08-27 DIAGNOSIS — J02.0 STREP THROAT: ICD-10-CM

## 2024-08-27 DIAGNOSIS — G44.209 TENSION HEADACHE: Primary | ICD-10-CM

## 2024-08-27 LAB
DEPRECATED S PYO AG THROAT QL EIA: NEGATIVE
GROUP A STREP BY PCR: NOT DETECTED

## 2024-08-27 PROCEDURE — 87651 STREP A DNA AMP PROBE: CPT | Performed by: FAMILY MEDICINE

## 2024-08-27 PROCEDURE — 99203 OFFICE O/P NEW LOW 30 MIN: CPT | Performed by: FAMILY MEDICINE

## 2024-08-27 PROCEDURE — 87635 SARS-COV-2 COVID-19 AMP PRB: CPT | Performed by: FAMILY MEDICINE

## 2024-08-27 RX ORDER — NITROFURANTOIN 25; 75 MG/1; MG/1
CAPSULE ORAL
COMMUNITY

## 2024-08-27 RX ORDER — PENICILLIN V POTASSIUM 500 MG/1
500 TABLET, FILM COATED ORAL 2 TIMES DAILY
Qty: 20 TABLET | Refills: 0 | Status: SHIPPED | OUTPATIENT
Start: 2024-08-27 | End: 2024-09-06

## 2024-08-27 RX ORDER — LEVONORGESTREL 52 MG/1
INTRAUTERINE DEVICE INTRAUTERINE
COMMUNITY
Start: 2023-02-27

## 2024-08-27 NOTE — PROGRESS NOTES
Assessment & Plan     Fever  URI  - Streptococcus A Rapid Screen w/Reflex to PCR - Clinic Collect  - Group A Streptococcus PCR Throat Swab    Tension headache  URI  - Symptomatic COVID-19 Virus (Coronavirus) by PCR Nose             No follow-ups on file.    [facility] Urgent Care Provider  [facility] MICHEL Galicia is a 38 year old female who presents to clinic today for the following health issues:  Chief Complaint   Patient presents with    Urgent Care     Exposure to strep, wants to be tested.       HPI    Headache and some ST  No fever.  No cough or runny nose.        Review of Systems        Objective    /63   Pulse 94   Temp 98.9  F (37.2  C) (Tympanic)   Resp 20   Wt 67 kg (147 lb 9.6 oz)   LMP  (LMP Unknown)   SpO2 99%   Breastfeeding No   BMI 22.44 kg/m    Physical Exam  Vitals and nursing note reviewed.   Constitutional:       Appearance: Normal appearance.   HENT:      Right Ear: Tympanic membrane and ear canal normal.      Left Ear: Tympanic membrane and ear canal normal.      Mouth/Throat:      Mouth: Mucous membranes are moist.   Eyes:      Extraocular Movements: Extraocular movements intact.      Pupils: Pupils are equal, round, and reactive to light.   Cardiovascular:      Rate and Rhythm: Normal rate and regular rhythm.      Pulses: Normal pulses.      Heart sounds: Normal heart sounds.   Pulmonary:      Effort: Pulmonary effort is normal.      Breath sounds: Normal breath sounds.   Musculoskeletal:      Cervical back: Neck supple.   Lymphadenopathy:      Cervical: Cervical adenopathy present.   Neurological:      Mental Status: She is alert.

## 2024-08-28 LAB — SARS-COV-2 RNA RESP QL NAA+PROBE: NEGATIVE

## 2024-11-10 ENCOUNTER — HEALTH MAINTENANCE LETTER (OUTPATIENT)
Age: 39
End: 2024-11-10